# Patient Record
Sex: MALE | Race: WHITE
[De-identification: names, ages, dates, MRNs, and addresses within clinical notes are randomized per-mention and may not be internally consistent; named-entity substitution may affect disease eponyms.]

---

## 2021-01-15 NOTE — PCM.HP.2
H&P History of Present Illness





- General


Date of Service: 01/15/21


Admit Problem/Dx: 


                           Admission Diagnosis/Problem





Admission Diagnosis/Problem      NSTEMI, initial episode of care








Source of Information: Patient, Family, Provider


History Limitations: Reports: No Limitations





- History of Present Illness


Initial Comments - Free Text/Narative: 





CC: my stomach has been hurting 





HPI: Raymond presents to the ER with epigastric and lower chest pain that has been 

off and on for the past 2-3 nights.  Pain is also present during the day but 

typically at a lower level.  The most intense pain often wakes him up at night. 

He describes a dull pain that can be quite intense that starts just underneath 

the sternum and radiates up into the chest slightly and down into the abdomen 

slightly.  He has taken [place] which helps slightly.  He has taken 

nitroglycerin on a couple of occasions and this has been more helpful and even 

eliminated the pain for a while.  He has had similar but much less intense pain 

over the last couple of weeks.  He does report dyspnea on exertion with some 

good days and some bad days.  If he takes it easy he does not have much trouble.

 He has an occasional dry cough.  He does not report orthopnea.  No change in 

bowel habits.  He has a chronic indwelling catheter and has not had any trouble 

with this.  No lower extremity edema.  He does note that he ran out of his 

carvedilol about 2 weeks ago and things have been going downhill since that 

time.  He was admitted to [facility] in [place] in November and diagnosed with 

uncorrectable severe three-vessel coronary artery disease, intestinal angina as 

well as bladder outlet obstruction due to BPH.





Work-up in the emergency room has revealed a troponin of 1.5 and mild lateral ST

depressions.  The case was discussed with the on-call cardiologist at [facility] in

[place].  They felt that given his severe disease medical management was the only 

option.  He will be admitted to the intensive care unit for management of an 

non-ST elevation myocardial infarction.





- Related Data


Allergies/Adverse Reactions: 


                                    Allergies











Allergy/AdvReac Type Severity Reaction Status Date / Time


 


No Known Allergies Allergy   Verified 01/15/21 14:21











Home Medications: 


                                    Home Meds





Aspirin 81 mg PO DAILY 01/15/21 [History]


Bimatoprost [Lumigan 0.01% Ophth Soln] 1 drop EYEBOTH DAILY 01/15/21 [History]


Capsaicin [Zostrix 0.025% Crm] 1 dose TOP ASDIRECTED 01/15/21 [History]


Furosemide [Lasix] 20 mg PO DAILY 01/15/21 [History]


Hydrocodone/Acetaminophen [Hydrocodon-Acetaminophen 5-325] 1 tab PO ASDIRECTED 

01/15/21 [History]


Isosorbide Mononitrate [Isosorbide Mononitrate ER] 30 mg PO DAILY 01/15/21 

[History]


Mirtazapine 15 mg PO BEDTIME 01/15/21 [History]


Nitroglycerin 0.4 mg SL ASDIRECTED 01/15/21 [History]


Omeprazole 40 mg PO DAILY 01/15/21 [History]


Ondansetron [Zofran ODT] 4 mg PO ASDIRECTED 01/15/21 [History]


Sennosides/Docusate Sodium [Senna-Docusate Sodium Tablet] 2 tab PO DAILY 

01/15/21 [History]


Tamsulosin [Flomax] 0.4 mg PO DAILY 01/15/21 [History]


Timolol Maleate/PF [Timolol Maleate 0.5% Eye Drop] 1 dose EYEBOTH BEDTIME 

01/15/21 [History]


atorvaSTATin Calcium [Atorvastatin Calcium] 80 mg PO BEDTIME 01/15/21 [History]


carvediloL [Carvedilol] 6.25 mg PO BID 01/15/21 [History]











Past Medical History


HEENT History: Reports: Impaired Vision


Cardiovascular History: Reports: CAD, Heart Failure, Hypertension, MI


Gastrointestinal History: Reports: GERD


Genitourinary History: Reports: BPH, Other (See Below)


Other Genitourinary History: Chronic mercado cath


Musculoskeletal History: Reports: Fracture, Other (See Below)


Other Musculoskeletal History: shoulder dislocation





- Past Surgical History


HEENT Surgical History: Reports: Tonsillectomy


GI Surgical History: Reports: Hernia Repair/Other





Social & Family History





- Family History


Cardiac: Denies: CAD





- Tobacco Use


Tobacco Use Status *Q: Light Tobacco User


Years of Tobacco use: 72


Packs/Tins Daily: 0.5





- Caffeine Use


Caffeine Use: Reports: Coffee





- Alcohol Use


Alcohol Use History: No





- Recreational Drug Use


Recreational Drug Use: No





H&P Review of Systems





- Review of Systems:


Review Of Systems: See Below


Free Text/Narrative: 





A complete 12 point review of systems was obtained.  Pertinent positives and 

negatives are noted in the history of present illness.  All other systems were 

reviewed and were negative except as noted.





Exam





- Exam


Exam: See Below





- Vital Signs


Vital Signs: 


                                Last Vital Signs











Temp  36.6 C   01/15/21 14:24


 


Pulse  101 H  01/15/21 15:51


 


Resp  25 H  01/15/21 16:08


 


BP  148/96 H  01/15/21 16:08


 


Pulse Ox  98   01/15/21 16:08











Weight: 62.596 kg





- Exam


Quality Assessment: No: Supplemental Oxygen


General: Alert, Oriented, Cooperative.  No: Mild Distress


HEENT: Conjunctiva Clear.  No: Mucosa Moist & Pink (dry), Scleral Icterus


Neck: Supple, Trachea Midline


Lungs: Normal Respiratory Effort, Crackles (both bases)


Cardiovascular: Regular Rhythm, Tachycardia, Systolic Murmur, Gallop/S3


GI/Abdominal Exam: Normal Bowel Sounds, Soft, Non-Tender, No Distention


Back Exam: Normal Inspection, Full Range of Motion


Extremities: No Pedal Edema.  No: Increased Warmth


Peripheral Pulses: 1+: Dorsalis Pedis (L), Dorsalis Pedis (R)


Skin: Warm, Dry


Neuro Extensive - Mental Status: Alert, Oriented x3, Nl Response to Commands


Neuro Extensive - Motor, Sensory, Reflexes: No: Dysarthria, Abnormal Motor, 

Tremor


Psychiatric: Alert, Normal Affect





- Patient Data


Lab Results Last 24 hrs: 


                         Laboratory Results - last 24 hr











  01/15/21 01/15/21 01/15/21 Range/Units





  14:56 14:56 15:40 


 


WBC    7.2  (4.5-11.0)  K/uL


 


RBC    3.77 L  (4.30-5.90)  M/uL


 


Hgb    12.1  (12.0-15.0)  g/dL


 


Hct    38.0 L  (40.0-54.0)  %


 


MCV    101 H  (80-98)  fL


 


MCH    32 H  (27-31)  pg


 


MCHC    32  (32-36)  %


 


Plt Count    183  (150-400)  K/uL


 


Sodium   144   (140-148)  mmol/L


 


Potassium   4.1   (3.6-5.2)  mmol/L


 


Chloride   108   (100-108)  mmol/L


 


Carbon Dioxide   23   (21-32)  mmol/L


 


Anion Gap   13.2   (5.0-14.0)  mmol/L


 


BUN   32 H   (7-18)  mg/dL


 


Creatinine   1.1   (0.8-1.3)  mg/dL


 


Est Cr Clr Drug Dosing   43.47   mL/min


 


Estimated GFR (MDRD)   > 60   (>60)  


 


Glucose   104   ()  mg/dL


 


Calcium   9.2   (8.5-10.1)  mg/dL


 


Troponin I  1.497 H*    (0.000-0.056)  ng/mL


 


NT-Pro-B Natriuret Pep  16067 H    (5-450)  pg/mL











Result Diagrams: 


                                 01/15/21 15:40





                                 01/15/21 14:56


Imaging Impressions Last 24 hrs: 


CXR-images personally reviewed-lungs clear with no mass, infiltrate or effusion.

 heart size normal. 


  ** #1 Interpretation


EKG Date: 01/15/21


Rhythm: Other (sinus tachycardia)


Rate (Beats/Min): 116


Axis: LAD-Left Axis Deviation (LAFB)


P-Wave: Present


QRS: Normal


ST-T: Depressed (V4-V6)


QT: Normal


Comparison: NA - No Prior EKG


EKG Interpretation Comments: 





image personally reviewed 





Sepsis Event Note





- Evaluation


Sepsis Screening Result: No Definite Risk





- Focused Exam


Vital Signs: 


                                   Vital Signs











  Temp Pulse Pulse Resp BP BP Pulse Ox


 


 01/15/21 16:08     25 H   148/96 H  98


 


 01/15/21 15:51   101 H    151/99 H  


 


 01/15/21 14:53   104 H    179/101 H  


 


 01/15/21 14:24  36.6 C   115 H  22 H   178/103 H  96


 


 01/15/21 14:20  36.6 C   115 H  22 H   178/103 H  96














*Q Meaningful Use (ADM)





- VTE Risk Assess *Q


Each Risk Factor Represents 1 Point: Acute myocardial infarction, Congestive hea

rt failure (CHF)


Total Score 1 Point Risk Factors: 2


Each Risk Factor Represents 2 Points: None


Total Score 2 Point Risk Factors: 0


Each Risk Factor Represents 3 Points: Age 75 Years or Greater


Total Score 3 Point Risk Factors: 3


Each Risk Factor Represents 5 Points: None


Total Score 5 Point Risk Factors: 0


Venous Thromboembolism Risk Factor Score *Q: 5





- Problem List


(1) Non-STEMI (non-ST elevated myocardial infarction)


SNOMED Code(s): 94152337


   ICD Code: I21.4 - NON-ST ELEVATION (NSTEMI) MYOCARDIAL INFARCTION   Status: 

Acute   Current Visit: Yes   





(2) Systolic congestive heart failure


SNOMED Code(s): 30439160, 019328118


   ICD Code: I50.20 - UNSPECIFIED SYSTOLIC (CONGESTIVE) HEART FAILURE   Status: 

Chronic   Current Visit: Yes   


Qualifiers: 


   Heart failure chronicity: chronic   Qualified Code(s): I50.22 - Chronic 

systolic (congestive) heart failure   





(3) Intestinal angina


SNOMED Code(s): 743295090


   ICD Code: K55.1 - CHRONIC VASCULAR DISORDERS OF INTESTINE   Status: Chronic  

 Current Visit: Yes   





(4) BPH with urinary obstruction


SNOMED Code(s): 190767183


   ICD Code: N40.1 - BENIGN PROSTATIC HYPERPLASIA WITH LOWER URINARY TRACT SYMP;

 N13.8 - OTHER OBSTRUCTIVE AND REFLUX UROPATHY   Status: Chronic   Current 

Visit: Yes   





(5) 3-vessel coronary artery disease


Status: Chronic   Current Visit: Yes   Problem Details: uncorrectable 3 vessel 

disease (occluded RCA, 80% prox LAD, 99% circumflex and 90% M2)   


Problem List Initiated/Reviewed/Updated: Yes


Orders Last 24hrs: 


                               Active Orders 24 hr











 Category Date Time Status


 


 Patient Status Manage Transfer [TRANSFER] Routine ADT  01/15/21 16:49 Ordered


 


 Cardiac Monitoring [RC] .As Directed Care  01/15/21 14:16 Active


 


 EKG Documentation Completion [RC] ASDIRECTED Care  01/15/21 14:16 Active


 


 Chest 1V Frontal [CR] Stat Exams  01/15/21 15:41 Taken


 


 Resuscitation Status Routine Resus Stat  01/15/21 16:52 Ordered


 


 EKG 12 Lead [EK] Routine Ther  01/15/21 14:15 Ordered











Assessment/Plan Comment:: 





ASSESSMENT AND PLAN - 





Non-ST elevation myocardial infarction-this occurs in the setting of severe 

three-vessel coronary artery disease with a completely occluded RCA, 80% 

occluded proximal LAD and a 99% circumflex occlusion.  He was too high risk for 

PCI or CABG.  Discussed with on-call cardiologist and medical management rec

ommended.  He has received aspirin, 12.5 mg of carvedilol.


-Heparin bolus followed by heparin infusion for 24 to 48 hours


-Continue aspirin high-dose atorvastatin


-Start clopidogrel in the morning


-Continue beta-blocker, consider dose increase if needed


-Continue Imdur, consider dose increase


-Consider lisinopril if blood pressure will allow





Systolic congestive heart failure-chronic, stable at this time.  BNP is quite 

elevated but volume status appears appropriate based on examination.  He did 

receive furosemide in the emergency room but I do not believe he needs 

additional diuretics at this time.


-Continue medical management including beta-blocker, aspirin and statin


-Consider ACE inhibitor





Intestinal angina-suspected flow-limiting lesions noted on CT angiogram in [place]

 back in November.  Symptoms wax and wane.


-Small more frequent meals


-Additional medical management as above





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





Maintenance issues - 


- DVT prophylaxis -heparin


- GI prophylaxis -PPI


- Nutrition -low-sodium


- Mercado catheter -chronic indwelling Mercado





CODE STATUS -DNR/DNI





Admission justification -this patient will be admitted for inpatient services 

and is medically appropriate meeting medical necessity for inpatient admission 

as outlined in my documentation.  I reasonably expect the patient will require 

inpatient services that span a period time over 2 midnights. I reasonably expect

 this patient to be discharged or transferred within 96 hours [facility]





Disposition -I would anticipate discharge with home care





Primary care physician -Dr. Kyree Forrest M.D.





- Mortality Measure


Prognosis:: Poor

## 2021-01-16 NOTE — PCM.PN
- General Info


Date of Service: 01/16/21


Subjective Update: 





The patient had difficulty with confusion overnight.  He did receive 1 dose of 

lorazepam and has done well since that time.  His blood pressure and heart rate 

have trended down overnight.  Troponin level has trended down as well.  He 

reports very mild discomfort in the epigastric area today which is improved.  He

did have a mild increase in abdominal pain after breakfast.  Oxygenation has 

been good but he does report that he feels more short of breath today.  

Tolerating blood thinner so far.  Kidney function stable.


Functional Status: Reports: Pain Controlled, Tolerating Diet





- Review of Systems


General: Denies: Fever


Pulmonary: Reports: Shortness of Breath


Gastrointestinal: Reports: Abdominal Pain (mild)





- Patient Data


Vitals - Most Recent: 


                                Last Vital Signs











Temp  36.6 C   01/16/21 08:00


 


Pulse  93   01/16/21 08:35


 


Resp  23 H  01/16/21 08:00


 


BP  157/100 H  01/16/21 08:36


 


Pulse Ox  95   01/16/21 08:00











Weight - Most Recent: 60.781 kg


I&O - Last 24 Hours: 


                                 Intake & Output











 01/15/21 01/16/21 01/16/21





 22:59 06:59 14:59


 


Intake Total  120 240


 


Output Total 2450 250 


 


Balance -2450 -130 240











Lab Results Last 24 Hours: 


                         Laboratory Results - last 24 hr











  01/15/21 01/15/21 01/15/21 Range/Units





  14:56 14:56 15:40 


 


WBC    7.2  (4.5-11.0)  K/uL


 


RBC    3.77 L  (4.30-5.90)  M/uL


 


Hgb    12.1  (12.0-15.0)  g/dL


 


Hct    38.0 L  (40.0-54.0)  %


 


MCV    101 H  (80-98)  fL


 


MCH    32 H  (27-31)  pg


 


MCHC    32  (32-36)  %


 


Plt Count    183  (150-400)  K/uL


 


APTT     (27.0-36.0)  sec


 


Sodium   144   (140-148)  mmol/L


 


Potassium   4.1   (3.6-5.2)  mmol/L


 


Chloride   108   (100-108)  mmol/L


 


Carbon Dioxide   23   (21-32)  mmol/L


 


Anion Gap   13.2   (5.0-14.0)  mmol/L


 


BUN   32 H   (7-18)  mg/dL


 


Creatinine   1.1   (0.8-1.3)  mg/dL


 


Est Cr Clr Drug Dosing   43.47   mL/min


 


Estimated GFR (MDRD)   > 60   (>60)  


 


Glucose   104   ()  mg/dL


 


Calcium   9.2   (8.5-10.1)  mg/dL


 


Magnesium     (1.8-2.4)  mg/dL


 


Troponin I  1.497 H*    (0.000-0.056)  ng/mL


 


NT-Pro-B Natriuret Pep  69534 H    (5-450)  pg/mL














  01/15/21 01/15/21 01/16/21 Range/Units





  17:15 20:02 00:30 


 


WBC     (4.5-11.0)  K/uL


 


RBC     (4.30-5.90)  M/uL


 


Hgb     (12.0-15.0)  g/dL


 


Hct     (40.0-54.0)  %


 


MCV     (80-98)  fL


 


MCH     (27-31)  pg


 


MCHC     (32-36)  %


 


Plt Count     (150-400)  K/uL


 


APTT  40.8 H   28.8  (27.0-36.0)  sec


 


Sodium     (140-148)  mmol/L


 


Potassium     (3.6-5.2)  mmol/L


 


Chloride     (100-108)  mmol/L


 


Carbon Dioxide     (21-32)  mmol/L


 


Anion Gap     (5.0-14.0)  mmol/L


 


BUN     (7-18)  mg/dL


 


Creatinine     (0.8-1.3)  mg/dL


 


Est Cr Clr Drug Dosing     mL/min


 


Estimated GFR (MDRD)     (>60)  


 


Glucose     ()  mg/dL


 


Calcium     (8.5-10.1)  mg/dL


 


Magnesium     (1.8-2.4)  mg/dL


 


Troponin I   1.431 H*   (0.000-0.056)  ng/mL


 


NT-Pro-B Natriuret Pep     (5-450)  pg/mL














  01/16/21 01/16/21 01/16/21 Range/Units





  05:53 05:53 05:53 


 


WBC   8.3   (4.5-11.0)  K/uL


 


RBC   4.08 L   (4.30-5.90)  M/uL


 


Hgb   13.6   (12.0-15.0)  g/dL


 


Hct   40.5   (40.0-54.0)  %


 


MCV   99 H   (80-98)  fL


 


MCH   33 H   (27-31)  pg


 


MCHC   34   (32-36)  %


 


Plt Count   176   (150-400)  K/uL


 


APTT     (27.0-36.0)  sec


 


Sodium    142  (140-148)  mmol/L


 


Potassium    3.5 L  (3.6-5.2)  mmol/L


 


Chloride    105  (100-108)  mmol/L


 


Carbon Dioxide    23  (21-32)  mmol/L


 


Anion Gap    17.5 H  (5.0-14.0)  mmol/L


 


BUN    29 H  (7-18)  mg/dL


 


Creatinine    1.1  (0.8-1.3)  mg/dL


 


Est Cr Clr Drug Dosing    42.21  mL/min


 


Estimated GFR (MDRD)    > 60  (>60)  


 


Glucose    103  ()  mg/dL


 


Calcium    9.3  (8.5-10.1)  mg/dL


 


Magnesium    1.8  (1.8-2.4)  mg/dL


 


Troponin I  1.297 H*    (0.000-0.056)  ng/mL


 


NT-Pro-B Natriuret Pep     (5-450)  pg/mL














  01/16/21 Range/Units





  05:53 


 


WBC   (4.5-11.0)  K/uL


 


RBC   (4.30-5.90)  M/uL


 


Hgb   (12.0-15.0)  g/dL


 


Hct   (40.0-54.0)  %


 


MCV   (80-98)  fL


 


MCH   (27-31)  pg


 


MCHC   (32-36)  %


 


Plt Count   (150-400)  K/uL


 


APTT  26.6 L  (27.0-36.0)  sec


 


Sodium   (140-148)  mmol/L


 


Potassium   (3.6-5.2)  mmol/L


 


Chloride   (100-108)  mmol/L


 


Carbon Dioxide   (21-32)  mmol/L


 


Anion Gap   (5.0-14.0)  mmol/L


 


BUN   (7-18)  mg/dL


 


Creatinine   (0.8-1.3)  mg/dL


 


Est Cr Clr Drug Dosing   mL/min


 


Estimated GFR (MDRD)   (>60)  


 


Glucose   ()  mg/dL


 


Calcium   (8.5-10.1)  mg/dL


 


Magnesium   (1.8-2.4)  mg/dL


 


Troponin I   (0.000-0.056)  ng/mL


 


NT-Pro-B Natriuret Pep   (5-450)  pg/mL











Med Orders - Current: 


                               Current Medications





Acetaminophen (Tylenol)  650 mg PO Q4H PRN


   PRN Reason: Pain (Mild 1-3)/fever


Hydrocodone Bitart/Acetaminophen (Norco 325-5 Mg)  1 tab PO Q4H PRN


   PRN Reason: Pain


Aspirin (Aspirin)  81 mg PO DAILY [facility]


   Last Admin: 01/16/21 08:36 Dose:  81 mg


   Documented by: 


Atorvastatin Calcium (Lipitor)  80 mg PO BEDTIME [facility]


   Last Admin: 01/15/21 20:09 Dose:  80 mg


   Documented by: 


Carvedilol (Coreg)  12.5 mg PO BIDAC [facility]


Clopidogrel Bisulfate (Plavix)  75 mg PO DAILY [facility]


   Last Admin: 01/16/21 08:36 Dose:  75 mg


   Documented by: 


Furosemide (Lasix)  20 mg PO DAILY [facility]


   Last Admin: 01/16/21 08:36 Dose:  20 mg


   Documented by: 


Haloperidol (Haldol)  2.5 mg PO Q4H PRN


   PRN Reason: Agitation


Sodium Chloride (Normal Saline)  1,000 mls @ 25 mls/hr IV ASDIRECTED [facility]


   Last Admin: 01/15/21 17:38 Dose:  25 mls/hr


   Documented by: 


Heparin Sodium/Dextrose (Heparin 25,000 Units In D5w 500 Ml)  25,000 units in 

500 mls @ 15.023 mls/hr IV TITRATE [facility]; Protocol


   Last Titration: 01/16/21 06:34 Dose:  22 units/kg/hr, 27.542 mls/hr


   Documented by: 


Isosorbide Mononitrate (Imdur)  30 mg PO DAILY [facility]


   Last Admin: 01/16/21 08:36 Dose:  30 mg


   Documented by: 


Latanoprost (Xalatan 0.005% Ophth Soln)  0 ml EYEBOTH BEDTIME [facility]


   Last Admin: 01/15/21 20:11 Dose:  1 drop


   Documented by: 


Magnesium Hydroxide (Milk Of Magnesia)  30 ml PO Q12H PRN


   PRN Reason: Constipation


Melatonin (Melatonin)  9 mg PO BEDTIME [facility]


Mirtazapine (Remeron)  15 mg PO BEDTIME [facility]


   Last Admin: 01/15/21 20:10 Dose:  15 mg


   Documented by: 


Morphine Sulfate (Morphine)  2 mg IVPUSH Q2H PRN


   PRN Reason: Pain (severe 7-10)


Ondansetron HCl (Zofran)  4 mg IV Q6H PRN


   PRN Reason: Nausea/Vomiting


Ondansetron HCl (Zofran Odt)  4 mg PO Q6H PRN


   PRN Reason: Nausea able to take PO


Pantoprazole Sodium (Protonix***)  40 mg PO ACBREAKFAST [facility]


   Last Admin: 01/16/21 07:47 Dose:  40 mg


   Documented by: 


Senna/Docusate Sodium (Senna Plus)  2 tab PO DAILY [facility]


   Last Admin: 01/16/21 08:36 Dose:  2 tab


   Documented by: 


Senna/Docusate Sodium (Senna Plus)  1 tab PO BID PRN


   PRN Reason: Constipation


Tamsulosin HCl (Flomax)  0.4 mg PO DAILY [facility]


   Last Admin: 01/16/21 08:36 Dose:  0.4 mg


   Documented by: 


Timolol Maleate (Timoptic 0.5% Ophth Soln)  0 ml EYEBOTH DAILY [facility]


   Last Admin: 01/16/21 08:37 Dose:  1 drop


   Documented by: 





Discontinued Medications





Aspirin (Aspirin)  243 mg PO ONETIME ONE


   Stop: 01/15/21 15:41


   Last Admin: 01/15/21 15:51 Dose:  243 mg


   Documented by: 


Carvedilol (Coreg)  6.25 mg PO ONETIME ONE


   Stop: 01/15/21 14:38


   Last Admin: 01/15/21 14:53 Dose:  6.25 mg


   Documented by: 


Carvedilol (Coreg)  6.25 mg PO ONETIME ONE


   Stop: 01/15/21 15:45


   Last Admin: 01/15/21 15:51 Dose:  6.25 mg


   Documented by: 


Carvedilol (Coreg)  6.25 mg PO BIDAC [facility]


   Last Admin: 01/16/21 08:02 Dose:  6.25 mg


   Documented by: 


Carvedilol (Coreg)  6.25 mg PO ONETIME ONE


   Stop: 01/16/21 08:16


   Last Admin: 01/16/21 08:35 Dose:  6.25 mg


   Documented by: 


Furosemide (Lasix)  40 mg IVPUSH ONETIME ONE


   Stop: 01/15/21 16:07


   Last Admin: 01/15/21 16:16 Dose:  40 mg


   Documented by: 


Heparin Sodium (Porcine) (Heparin Sodium)  4,000 units IVPUSH ONETIME ONE


   Stop: 01/15/21 15:43


   Last Admin: 01/15/21 15:54 Dose:  4,000 units


   Documented by: 


Lorazepam (Ativan)  0.5 mg IVPUSH Q4H PRN


   PRN Reason: Nausea/Vomiting


   Last Admin: 01/15/21 20:35 Dose:  0.5 mg


   Documented by: 


Potassium Chloride (Klor-Con M20)  40 meq PO ONETIME ONE


   Stop: 01/16/21 08:16


   Last Admin: 01/16/21 08:35 Dose:  40 meq


   Documented by: 


Ticagrelor (Brilinta)  180 mg PO ONETIME ONE


   Stop: 01/15/21 15:44


   Last Admin: 01/15/21 15:51 Dose:  180 mg


   Documented by: 











- Exam


Quality Assessment: No: Supplemental Oxygen


General: Alert, Oriented, Cooperative, No Acute Distress


Neck: No JVD


Lungs: Normal Respiratory Effort, Decreased Breath Sounds (mild both bases)


Cardiovascular: Regular Rate, Irregular Rhythm, Murmurs


GI/Abdominal Exam: Soft, No Distention


Extremities: No Pedal Edema.  No: Increased Warmth


Skin: Warm, Dry


Psy/Mental Status: Alert, Normal Affect





Sepsis Event Note





- Evaluation


Sepsis Screening Result: No Definite Risk





- Focused Exam


Vital Signs: 


                                   Vital Signs











  Temp Pulse Pulse Resp BP BP Pulse Ox


 


 01/16/21 08:36      157/100 H  


 


 01/16/21 08:35   93    157/100 H  


 


 01/16/21 08:02   108 H    158/94 H  


 


 01/16/21 08:00  36.6 C   108 H  23 H   158/94 H  95


 


 01/16/21 07:00        96


 


 01/16/21 06:00  36.9 C   111 H  20   148/85 H  96


 


 01/16/21 00:00    95  22 H    96














- Problem List & Annotations


(1) Non-STEMI (non-ST elevated myocardial infarction)


SNOMED Code(s): 13535415


   Code(s): I21.4 - NON-ST ELEVATION (NSTEMI) MYOCARDIAL INFARCTION   Status: 

Acute   Current Visit: Yes   





(2) Systolic congestive heart failure


SNOMED Code(s): 58513384, 901003346


   Code(s): I50.20 - UNSPECIFIED SYSTOLIC (CONGESTIVE) HEART FAILURE   Status: 

Chronic   Current Visit: Yes   


Qualifiers: 


   Heart failure chronicity: chronic   Qualified Code(s): I50.22 - Chronic 

systolic (congestive) heart failure   





(3) Intestinal angina


SNOMED Code(s): 192500836


   Code(s): K55.1 - CHRONIC VASCULAR DISORDERS OF INTESTINE   Status: Chronic   

Current Visit: Yes   





(4) BPH with urinary obstruction


SNOMED Code(s): 759672096


   Code(s): N40.1 - BENIGN PROSTATIC HYPERPLASIA WITH LOWER URINARY TRACT SYMP; 

N13.8 - OTHER OBSTRUCTIVE AND REFLUX UROPATHY   Status: Chronic   Current Visit:

Yes   





(5) 3-vessel coronary artery disease


Status: Chronic   Current Visit: Yes   Annotation/Comment:: uncorrectable 3 

vessel disease (occluded RCA, 80% prox LAD, 99% circumflex and 90% M2)   





- Problem List Review


Problem List Initiated/Reviewed/Updated: Yes





- My Orders


Last 24 Hours: 


My Active Orders





01/15/21 16:52


Resuscitation Status Routine 





01/15/21 Dinner


2 Gram Sodium Diet [DIET] 





01/15/21 17:15


Acetaminophen [TylenoL]   650 mg PO Q4H PRN 


Acetaminophen/HYDROcodone [Norco 325-5 MG]   1 tab PO Q4H PRN 


Docusate Sodium/Sennosides [Senna Plus]   1 tab PO BID PRN 


Heparin Sodium/D5W [Heparin 25,000 Units in D5W 500 ML] 25,000 units in 500 ml 

IV TITRATE 


Magnesium Hydroxide [Milk of Magnesia]   30 ml PO Q12H PRN 


Morphine   2 mg IVPUSH Q2H PRN 


Ondansetron [Zofran ODT]   4 mg PO Q6H PRN 


Ondansetron [Zofran]   4 mg IV Q6H PRN 


Sodium Chloride 0.9% [Normal Saline] 1,000 ml IV ASDIRECTED 





01/15/21 17:15


Patient Status [ADT] Routine 


Cardiac Monitoring [RC] CONTINUOUS 


Height and Weight [RC] DAILY 


Intake and Output [RC] QSHIFT 


Notify Provider Vital Signs [RC] ASDIRECTED 


Oxygen Therapy [RC] PRN 


Pulse Oximetry [RC] CONTINUOUS 


Up With Assistance [RC] ASDIRECTED 


VTE/DVT Education [RC] Per Unit Routine 


Vital Signs [RC] Q2HR 





01/15/21 21:00


Latanoprost [Xalatan 0.005% Ophth Soln]   0 ml EYEBOTH BEDTIME 


Mirtazapine [Remeron]   15 mg PO BEDTIME 


atorvaSTATin [Lipitor]   80 mg PO BEDTIME 





01/16/21 07:30


Pantoprazole [ProTONIX***]   40 mg PO ACBREAKFAST 





01/16/21 09:00


Aspirin   81 mg PO DAILY 


Clopidogrel [Plavix]   75 mg PO DAILY 


Docusate Sodium/Sennosides [Senna Plus]   2 tab PO DAILY 


Furosemide [Lasix]   20 mg PO DAILY 


Isosorbide Mononitrate [Imdur]   30 mg PO DAILY 


Tamsulosin [Flomax]   0.4 mg PO DAILY 


timoloL maleate [Timoptic 0.5% Ophth Soln]   0 ml EYEBOTH DAILY 





01/16/21 10:55


haloperidoL [Haldol]   2.5 mg PO Q4H PRN 





01/16/21 11:00


PTT,PARTIAL THROMBOPLSTIN TIME [COAG] Q6H 





01/16/21 16:30


carvediloL [Coreg]   12.5 mg PO BIDAC 





01/16/21 17:00


PTT,PARTIAL THROMBOPLSTIN TIME [COAG] Q6H 





01/16/21 21:00


Melatonin   9 mg PO BEDTIME 





01/16/21 23:00


PTT,PARTIAL THROMBOPLSTIN TIME [COAG] Q6H 





01/17/21 05:00


BASIC METABOLIC PANEL,BMP [CHEM] Timed 


TROPONIN I [CHEM] Timed 














- Plan


Plan:: 





ASSESSMENT AND PLAN - 





Non-ST elevation myocardial infarction-this occurs in the setting of severe 

three-vessel coronary artery disease with a completely occluded RCA, 80% 

occluded proximal LAD and a 99% circumflex occlusion.  Troponin level trending 

down.  Symptoms have been improving.  Heart rate and blood pressure are 

improving.


-Heparin bolus followed by heparin infusion for 24 to 48 hours


-Continue aspirin, high-dose atorvastatin


-Continue clopidogrel (new medication)


-Continue beta-blocker with dose increased to 12.5 mg


-Continue Imdur, consider dose increase


-Consider lisinopril if blood pressure will allow


-Recheck troponin in the morning





Systolic congestive heart failure-chronic, stable at this time.  He does feel 

short of breath but his volume status appears appropriate with no JVD.


-Continue medical management including beta-blocker, aspirin and statin


-Consider ACE inhibitor





Intestinal angina-suspected flow-limiting lesions noted on CT angiogram in [place]

 back in November.  Symptoms mild today and have improved with improvements in 

blood pressure and heart rate.


-Small, more frequent meals


-Additional medical management as above





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





Maintenance issues - 


- DVT prophylaxis -heparin


- GI prophylaxis -PPI


- Nutrition -low-sodium


- Cook catheter -chronic indwelling Cook





CODE STATUS -DNR/DNI





Admission justification -this patient will be admitted for inpatient services 

and is medically appropriate meeting medical necessity for inpatient admission 

as outlined in my documentation.  I reasonably expect the patient will require 

inpatient services that span a period time over 2 midnights. I reasonably expect

 this patient to be discharged or transferred within 96 hours after admission to

 the [facility].





Disposition -I would anticipate discharge home with home care





Primary care physician -Angel Allen in [place] M.D.

## 2021-01-17 NOTE — PCM.PN
- General Info


Date of Service: 01/17/21


Subjective Update: 





Mr. Manzo has been stable over the last 24 hours, denies significant chest pain

or shortness of breath.  Troponin level has improved from admission and he has 

been hemodynamically stable.


Functional Status: Reports: Tolerating Diet, Ambulating, Urinating





- Review of Systems


General: Reports: Weakness, Fatigue.  Denies: Fever, Chills


Pulmonary: Reports: No Symptoms


Cardiovascular: Reports: No Symptoms


Gastrointestinal: Reports: No Symptoms


Genitourinary: Reports: No Symptoms





- Patient Data


Vitals - Most Recent: 


                                Last Vital Signs











Temp  97.6 F   01/17/21 07:00


 


Pulse  83   01/17/21 09:28


 


Resp  18   01/17/21 09:28


 


BP  104/60   01/17/21 09:28


 


Pulse Ox  94 L  01/17/21 09:28











Weight - Most Recent: 134 lb


I&O - Last 24 Hours: 


                                 Intake & Output











 01/16/21 01/17/21 01/17/21





 22:59 06:59 14:59


 


Intake Total  1507 440


 


Output Total  200 


 


Balance  1307 440











Lab Results Last 24 Hours: 


                         Laboratory Results - last 24 hr











  01/16/21 01/16/21 01/17/21 Range/Units





  16:52 23:00 05:50 


 


APTT  66.5 H  86.1 H   (27.0-36.0)  sec


 


Sodium    146  (140-148)  mmol/L


 


Potassium    3.3 L  (3.6-5.2)  mmol/L


 


Chloride    109 H  (100-108)  mmol/L


 


Carbon Dioxide    24  (21-32)  mmol/L


 


Anion Gap    16.3 H  (5.0-14.0)  mmol/L


 


BUN    35 H  (7-18)  mg/dL


 


Creatinine    1.3  (0.8-1.3)  mg/dL


 


Est Cr Clr Drug Dosing    35.72  mL/min


 


Estimated GFR (MDRD)    52 L  (>60)  


 


Glucose    97  ()  mg/dL


 


Calcium    8.7  (8.5-10.1)  mg/dL


 


Troponin I    0.840 H*  (0.000-0.056)  ng/mL














  01/17/21 Range/Units





  05:50 


 


APTT  84.8 H  (27.0-36.0)  sec


 


Sodium   (140-148)  mmol/L


 


Potassium   (3.6-5.2)  mmol/L


 


Chloride   (100-108)  mmol/L


 


Carbon Dioxide   (21-32)  mmol/L


 


Anion Gap   (5.0-14.0)  mmol/L


 


BUN   (7-18)  mg/dL


 


Creatinine   (0.8-1.3)  mg/dL


 


Est Cr Clr Drug Dosing   mL/min


 


Estimated GFR (MDRD)   (>60)  


 


Glucose   ()  mg/dL


 


Calcium   (8.5-10.1)  mg/dL


 


Troponin I   (0.000-0.056)  ng/mL











Med Orders - Current: 


                               Current Medications





Acetaminophen (Tylenol)  650 mg PO Q4H PRN


   PRN Reason: Pain (Mild 1-3)/fever


Hydrocodone Bitart/Acetaminophen (Norco 325-5 Mg)  1 tab PO Q4H PRN


   PRN Reason: Pain


Aspirin (Aspirin)  81 mg PO DAILY [facility]


   Last Admin: 01/17/21 07:59 Dose:  81 mg


   Documented by: 


Atorvastatin Calcium (Lipitor)  80 mg PO BEDTIME [facility]


   Last Admin: 01/16/21 20:21 Dose:  80 mg


   Documented by: 


Carvedilol (Coreg)  12.5 mg PO BIDAC [facility]


   Last Admin: 01/17/21 07:52 Dose:  12.5 mg


   Documented by: 


Clopidogrel Bisulfate (Plavix)  75 mg PO DAILY [facility]


   Last Admin: 01/17/21 08:00 Dose:  75 mg


   Documented by: 


Furosemide (Lasix)  20 mg PO DAILY [facility]


   Last Admin: 01/17/21 07:59 Dose:  20 mg


   Documented by: 


Haloperidol (Haldol)  2.5 mg PO Q4H PRN


   PRN Reason: Agitation


Isosorbide Mononitrate (Imdur)  30 mg PO DAILY [facility]


   Last Admin: 01/17/21 07:59 Dose:  30 mg


   Documented by: 


Latanoprost (Xalatan 0.005% Ophth Soln)  0 ml EYEBOTH BEDTIME [facility]


   Last Admin: 01/16/21 20:19 Dose:  1 drop


   Documented by: 


Magnesium Hydroxide (Milk Of Magnesia)  30 ml PO Q12H PRN


   PRN Reason: Constipation


Melatonin (Melatonin)  9 mg PO BEDTIME [facility]


   Last Admin: 01/16/21 20:22 Dose:  9 mg


   Documented by: 


Mirtazapine (Remeron)  15 mg PO BEDTIME [facility]


   Last Admin: 01/16/21 20:22 Dose:  15 mg


   Documented by: 


Morphine Sulfate (Morphine)  2 mg IVPUSH Q2H PRN


   PRN Reason: Pain (severe 7-10)


Ondansetron HCl (Zofran)  4 mg IV Q6H PRN


   PRN Reason: Nausea/Vomiting


Ondansetron HCl (Zofran Odt)  4 mg PO Q6H PRN


   PRN Reason: Nausea able to take PO


Pantoprazole Sodium (Protonix***)  40 mg PO ACBREAKFAST [facility]


   Last Admin: 01/17/21 07:53 Dose:  40 mg


   Documented by: 


Potassium Chloride (Klor-Con M20)  40 meq PO ONETIME ONE


   Stop: 01/17/21 12:36


Potassium Chloride (Klor-Con M20)  40 meq PO ONETIME ONE


   Stop: 01/17/21 17:01


Senna/Docusate Sodium (Senna Plus)  2 tab PO DAILY [facility]


   Last Admin: 01/17/21 07:59 Dose:  2 tab


   Documented by: 


Senna/Docusate Sodium (Senna Plus)  1 tab PO BID PRN


   PRN Reason: Constipation


Tamsulosin HCl (Flomax)  0.4 mg PO DAILY [facility]


   Last Admin: 01/17/21 07:59 Dose:  0.4 mg


   Documented by: 


Timolol Maleate (Timoptic 0.5% Ophth Soln)  0 ml EYEBOTH DAILY [facility]


   Last Admin: 01/17/21 08:00 Dose:  1 drop


   Documented by: 





Discontinued Medications





Aspirin (Aspirin)  243 mg PO ONETIME ONE


   Stop: 01/15/21 15:41


   Last Admin: 01/15/21 15:51 Dose:  243 mg


   Documented by: 


Carvedilol (Coreg)  6.25 mg PO ONETIME ONE


   Stop: 01/15/21 14:38


   Last Admin: 01/15/21 14:53 Dose:  6.25 mg


   Documented by: 


Carvedilol (Coreg)  6.25 mg PO ONETIME ONE


   Stop: 01/15/21 15:45


   Last Admin: 01/15/21 15:51 Dose:  6.25 mg


   Documented by: 


Carvedilol (Coreg)  6.25 mg PO BIDAC [facility]


   Last Admin: 01/16/21 08:02 Dose:  6.25 mg


   Documented by: 


Carvedilol (Coreg)  6.25 mg PO ONETIME ONE


   Stop: 01/16/21 08:16


   Last Admin: 01/16/21 08:35 Dose:  6.25 mg


   Documented by: 


Furosemide (Lasix)  40 mg IVPUSH ONETIME ONE


   Stop: 01/15/21 16:07


   Last Admin: 01/15/21 16:16 Dose:  40 mg


   Documented by: 


Heparin Sodium (Porcine) (Heparin Sodium)  4,000 units IVPUSH ONETIME ONE


   Stop: 01/15/21 15:43


   Last Admin: 01/15/21 15:54 Dose:  4,000 units


   Documented by: 


Heparin Sodium (Porcine) (Heparin Sodium)  1,000 units IVPUSH .BOLUS ONE


   Stop: 01/16/21 11:51


   Last Admin: 01/16/21 11:50 Dose:  1,000 units


   Documented by: 


Sodium Chloride (Normal Saline)  1,000 mls @ 25 mls/hr IV ASDIRECTED [facility]


   Last Admin: 01/15/21 17:38 Dose:  25 mls/hr


   Documented by: 


Heparin Sodium/Dextrose (Heparin 25,000 Units In D5w 500 Ml)  25,000 units in 

500 mls @ 15.023 mls/hr IV TITRATE [facility]; Protocol


   Last Admin: 01/17/21 07:48 Dose:  20 units/kg/hr, 25.038 mls/hr


   Documented by: 


Lorazepam (Ativan)  0.5 mg IVPUSH Q4H PRN


   PRN Reason: Nausea/Vomiting


   Last Admin: 01/15/21 20:35 Dose:  0.5 mg


   Documented by: 


Potassium Chloride (Klor-Con M20)  40 meq PO ONETIME ONE


   Stop: 01/16/21 08:16


   Last Admin: 01/16/21 08:35 Dose:  40 meq


   Documented by: 


Ticagrelor (Brilinta)  180 mg PO ONETIME ONE


   Stop: 01/15/21 15:44


   Last Admin: 01/15/21 15:51 Dose:  180 mg


   Documented by: 











- Exam


Quality Assessment: DVT Prophylaxis


General: Alert, Oriented, Cooperative, No Acute Distress


Lungs: Clear to Auscultation, Normal Respiratory Effort


Cardiovascular: Regular Rate, Regular Rhythm, Murmurs


GI/Abdominal Exam: Soft, Non-Tender, No Organomegaly, No Distention


Extremities: Non-Tender, No Pedal Edema





Sepsis Event Note





- Evaluation


Sepsis Screening Result: No Definite Risk





- Focused Exam


Vital Signs: 


                                   Vital Signs











  Temp Pulse Pulse Resp BP BP Pulse Ox


 


 01/17/21 09:28    83  18   104/60  94 L


 


 01/17/21 08:00    90  16   117/63 


 


 01/17/21 07:59      117/63  


 


 01/17/21 07:52   94    117/63  


 


 01/17/21 07:00  97.6 F   91  21 H   159/52 H  94 L


 


 01/17/21 05:58    89  22 H   


 


 01/17/21 04:00    98  21 H   108/70 


 


 01/17/21 02:00    99  15   121/60  90 L














- Problem List Review


Problem List Initiated/Reviewed/Updated: Yes





- My Orders


Last 24 Hours: 


My Active Orders





01/17/21 12:35


Potassium Chloride [Klor-Con M20]   40 meq PO ONETIME ONE 





01/17/21 12:36


Convert IV to Saline Lock [OM.PC] Routine 





01/17/21 17:00


Potassium Chloride [Klor-Con M20]   40 meq PO ONETIME ONE 





01/18/21 05:00


TROPONIN I [CHEM] Timed 





01/18/21 05:11


POTASSIUM,K [CHEM] AM 














- Plan


Plan:: 





ASSESSMENT AND PLAN - 





Non-ST elevation myocardial infarction-this occurs in the setting of severe 

three-vessel coronary artery disease with a completely occluded RCA, 80% 

occluded proximal LAD and a 99% circumflex occlusion.  Troponin level improved 

over the last 24 hours, no symptoms of chest pain.


-Continue aspirin, high-dose atorvastatin


-Continue clopidogrel (new medication)


-Continue beta-blocker with dose increased to 12.5 mg


-Continue Imdur


-Consider lisinopril if blood pressure will allow


-Recheck troponin in the morning





Systolic congestive heart failure-chronic, stable at this time.  He does feel 

short of breath but his volume status appears appropriate with no JVD.


-Continue medical management including beta-blocker, aspirin and statin


-Consider ACE inhibitor





Intestinal angina-suspected flow-limiting lesions noted on CT angiogram in [place]

 back in November.  Symptoms mild today and have improved with improvements in 

blood pressure and heart rate.


-Small, more frequent meals


-Additional medical management as above





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





Maintenance issues - 


- DVT prophylaxis -heparin


- GI prophylaxis -PPI


- Nutrition -low-sodium


- Cook catheter -chronic indwelling Cook





CODE STATUS -DNR/DNI





Admission justification -this patient will be admitted for inpatient services 

and is medically appropriate meeting medical necessity for inpatient admission 

as outlined in my documentation.  I reasonably expect the patient will require 

inpatient services that span a period time over 2 midnights. I reasonably expect

 this patient to be discharged or transferred within 96 hours after admission to

 the [facility].





Disposition -I would anticipate discharge home with home care





Primary care physician -Angel Allen in [place]

## 2021-01-18 NOTE — CR
CHEST: Portable 1/15/2021 at 4:07 PM

 

CLINICAL HISTORY:Chest pain

 

COMPARISON:None

 

FINDINGS:  The heart is enlarged. Pulmonary vascularity appears cephalized.

There is generalized interstitial prominence which is suspect for edema. This is

superimposed over some chronic lung field changes. There are no effusions. There

are atherosclerotic changes in the aorta.

 

IMPRESSION: Cardiomegaly with some vascular cephalization and interstitial

prominence suggest CHF. This is superimposed over some chronic lung changes.

## 2021-01-18 NOTE — PCM.DCSUM1
**Discharge Summary





- Hospital Course


Brief History: Mr. Manzo is an 85-year-old gentleman who was admitted through 

the emergency department with chest pain secondary to a non-ST segment elevation

myocardial infarction.





- Discharge Data


Discharge Date: 01/18/21


Discharge Disposition: Home, Self-Care 01


Condition: Fair





- Referral to Home Health


Primary Care Physician: 


PCP None








- Discharge Diagnosis/Problem(s)


(1) Non-STEMI (non-ST elevated myocardial infarction)


SNOMED Code(s): 47716033


   ICD Code: I21.4 - NON-ST ELEVATION (NSTEMI) MYOCARDIAL INFARCTION   Status: 

Acute   Current Visit: Yes   





(2) Systolic congestive heart failure


SNOMED Code(s): 51465239, 602005040


   ICD Code: I50.20 - UNSPECIFIED SYSTOLIC (CONGESTIVE) HEART FAILURE   Status: 

Chronic   Current Visit: Yes   


Qualifiers: 


   Heart failure chronicity: chronic   Qualified Code(s): I50.22 - Chronic 

systolic (congestive) heart failure   





(3) Intestinal angina


SNOMED Code(s): 930442111


   ICD Code: K55.1 - CHRONIC VASCULAR DISORDERS OF INTESTINE   Status: Chronic  

Current Visit: Yes   





(4) BPH with urinary obstruction


SNOMED Code(s): 793356145


   ICD Code: N40.1 - BENIGN PROSTATIC HYPERPLASIA WITH LOWER URINARY TRACT SYMP;

N13.8 - OTHER OBSTRUCTIVE AND REFLUX UROPATHY   Status: Chronic   Current Visit:

Yes   





(5) 3-vessel coronary artery disease


Status: Chronic   Current Visit: Yes   Problem Details: uncorrectable 3 vessel 

disease (occluded RCA, 80% prox LAD, 99% circumflex and 90% M2)   





- Patient Summary/Data


Hospital Course: 





Mr. Manzo presented to the ER with epigastric and lower chest pain that has 

been off and on for 2-3 nights.  Pain was also present during the day but 

typically at a lower level.  The most intense pain often wakes him up at night. 

He describes a dull pain that can be quite intense that starts just underneath 

the sternum and radiates up into the chest slightly and down into the abdomen 

slightly.  He has taken [employer] which helps slightly.  He has taken 

nitroglycerin on a couple of occasions and this has been more helpful and even 

eliminated the pain for a while.  He has had similar but much less intense pain 

over the last couple of weeks.  He does report dyspnea on exertion with some 

good days and some bad days.  If he takes it easy he does not have much trouble.

 He does note that he ran out of his carvedilol about 2 weeks ago and things 

have been going downhill since that time.  He was admitted to [facility] 

in November and diagnosed with uncorrectable severe three-vessel coronary artery

disease, intestinal angina as well as bladder outlet obstruction due to BPH. 

Work-up in the emergency room has revealed a troponin of 1.5 and mild lateral ST

depressions.  The case was discussed with the on-call cardiologist at [facility] in

[place].  They felt that given his severe disease medical management was the only 

option.  He was admitted to the intensive care unit for management of an non-ST 

elevation myocardial infarction.  On admission he was started on IV heparin and 

this was continued for 2 days.  In addition he was started on Plavix 75 mg daily

and his dose of Coreg was increased to 12.5 mg twice daily.  Consideration was 

given for addition of ACE inhibitor therapy but this was not started during the 

hospitalization because of lower blood pressures.  Troponin level was monitored 

daily and did gradually improved but was not yet back to baseline by the time of

discharge.  Prior to discharge he was up and walking about without significant 

symptoms of chest pain or shortness of breath.  Activity will be as tolerated 

and he will resume his usual diet.  Follow-up appointment will be scheduled with

primary care provider within 1 week.





- Patient Instructions


Diet: Low Sodium


Activity: As Tolerated


Other/Special Instructions: Please schedule follow-up appointment with primary 

care provider within 1 week.





- Discharge Plan


*PRESCRIPTION DRUG MONITORING PROGRAM REVIEWED*: Not Applicable


*COPY OF PRESCRIPTION DRUG MONITORING REPORT IN PATIENT HODA: Not Applicable


Prescriptions/Med Rec: 


carvediloL [Coreg] 12.5 mg PO BIDAC #60 tablet


Clopidogrel [Plavix] 75 mg PO DAILY #30 tablet


Home Medications: 


                                    Home Meds





Aspirin 81 mg PO DAILY 01/15/21 [History]


Bimatoprost [Lumigan 0.01% Ophth Soln] 1 drop EYEBOTH DAILY 01/15/21 [History]


Capsaicin [Zostrix 0.025% Crm] 1 dose TOP ASDIRECTED 01/15/21 [History]


Furosemide [Lasix] 20 mg PO DAILY 01/15/21 [History]


Hydrocodone/Acetaminophen [Hydrocodon-Acetaminophen 5-325] 1 tab PO ASDIRECTED 

01/15/21 [History]


Isosorbide Mononitrate [Isosorbide Mononitrate ER] 30 mg PO DAILY 01/15/21 

[History]


Mirtazapine 15 mg PO BEDTIME 01/15/21 [History]


Nitroglycerin 0.4 mg SL ASDIRECTED 01/15/21 [History]


Omeprazole 40 mg PO DAILY 01/15/21 [History]


Ondansetron [Zofran ODT] 4 mg PO ASDIRECTED 01/15/21 [History]


Sennosides/Docusate Sodium [Senna-Docusate Sodium Tablet] 2 tab PO DAILY 

01/15/21 [History]


Tamsulosin [Flomax] 0.4 mg PO DAILY 01/15/21 [History]


Timolol Maleate/PF [Timolol Maleate 0.5% Eye Drop] 1 dose EYEBOTH BEDTIME 

01/15/21 [History]


atorvaSTATin Calcium [Atorvastatin Calcium] 80 mg PO BEDTIME 01/15/21 [History]


Clopidogrel [Plavix] 75 mg PO DAILY #30 tablet 01/18/21 [Rx]


carvediloL [Coreg] 12.5 mg PO BIDAC #60 tablet 01/18/21 [Rx]








Referrals: 


Kyree Lott NP [Ordering Only Provider] - 01/25/21 9:40 am (Please arrive 

15 minutes early to register for your appointment.  )





- Discharge Summary/Plan Comment


DC Time >30 min.: No





- Patient Data


Vitals - Most Recent: 


                                Last Vital Signs











Temp  97.4 F   01/18/21 08:00


 


Pulse  78   01/18/21 10:00


 


Resp  25 H  01/18/21 10:00


 


BP  106/67   01/18/21 10:00


 


Pulse Ox  97   01/18/21 10:00











Weight - Most Recent: 133 lb 8 oz


I&O - Last 24 hours: 


                                 Intake & Output











 01/17/21 01/18/21 01/18/21





 22:59 06:59 14:59


 


Intake Total 856 240 120


 


Output Total 350 380 


 


Balance 506 -140 120











Lab Results - Last 24 hrs: 


                         Laboratory Results - last 24 hr











  01/18/21 01/18/21 Range/Units





  05:35 05:35 


 


Potassium   4.0  (3.6-5.2)  mmol/L


 


Troponin I  0.400 H*   (0.000-0.056)  ng/mL











Med Orders - Current: 


                               Current Medications





Acetaminophen (Tylenol)  650 mg PO Q4H PRN


   PRN Reason: Pain (Mild 1-3)/fever


Hydrocodone Bitart/Acetaminophen (Norco 325-5 Mg)  1 tab PO Q4H PRN


   PRN Reason: Pain


Aspirin (Aspirin)  81 mg PO DAILY [facility]


   Last Admin: 01/18/21 09:11 Dose:  81 mg


   Documented by: 


Atorvastatin Calcium (Lipitor)  80 mg PO BEDTIME [facility]


   Last Admin: 01/17/21 21:12 Dose:  80 mg


   Documented by: 


Carvedilol (Coreg)  12.5 mg PO BIDAC [facility]


   Last Admin: 01/18/21 08:04 Dose:  12.5 mg


   Documented by: 


Clopidogrel Bisulfate (Plavix)  75 mg PO DAILY [facility]


   Last Admin: 01/18/21 09:12 Dose:  75 mg


   Documented by: 


Enoxaparin Sodium (Lovenox)  40 mg SUBCUT Q24H [facility]


   Last Admin: 01/17/21 18:23 Dose:  40 mg


   Documented by: 


Furosemide (Lasix)  20 mg PO DAILY [facility]


   Last Admin: 01/18/21 09:11 Dose:  20 mg


   Documented by: 


Haloperidol (Haldol)  2.5 mg PO Q4H PRN


   PRN Reason: Agitation


Isosorbide Mononitrate (Imdur)  30 mg PO DAILY [facility]


   Last Admin: 01/18/21 09:11 Dose:  30 mg


   Documented by: 


Latanoprost (Xalatan 0.005% Ophth Soln)  0 ml EYEBOTH BEDTIME [facility]


   Last Admin: 01/17/21 21:11 Dose:  1 drop


   Documented by: 


Magnesium Hydroxide (Milk Of Magnesia)  30 ml PO Q12H PRN


   PRN Reason: Constipation


Melatonin (Melatonin)  9 mg PO BEDTIME [facility]


   Last Admin: 01/17/21 21:13 Dose:  9 mg


   Documented by: 


Mirtazapine (Remeron)  15 mg PO BEDTIME [facility]


   Last Admin: 01/17/21 21:14 Dose:  15 mg


   Documented by: 


Morphine Sulfate (Morphine)  2 mg IVPUSH Q2H PRN


   PRN Reason: Pain (severe 7-10)


Ondansetron HCl (Zofran)  4 mg IV Q6H PRN


   PRN Reason: Nausea/Vomiting


Ondansetron HCl (Zofran Odt)  4 mg PO Q6H PRN


   PRN Reason: Nausea able to take PO


Pantoprazole Sodium (Protonix***)  40 mg PO ACBREAKFAST [facility]


   Last Admin: 01/18/21 08:05 Dose:  40 mg


   Documented by: 


Senna/Docusate Sodium (Senna Plus)  2 tab PO DAILY [facility]


   Last Admin: 01/18/21 09:12 Dose:  2 tab


   Documented by: 


Senna/Docusate Sodium (Senna Plus)  1 tab PO BID PRN


   PRN Reason: Constipation


Tamsulosin HCl (Flomax)  0.4 mg PO DAILY [facility]


   Last Admin: 01/18/21 09:11 Dose:  0.4 mg


   Documented by: 


Timolol Maleate (Timoptic 0.5% Ophth Soln)  0 ml EYEBOTH DAILY [facility]


   Last Admin: 01/18/21 09:12 Dose:  1 drop


   Documented by: 





Discontinued Medications





Aspirin (Aspirin)  243 mg PO ONETIME ONE


   Stop: 01/15/21 15:41


   Last Admin: 01/15/21 15:51 Dose:  243 mg


   Documented by: 


Carvedilol (Coreg)  6.25 mg PO ONETIME ONE


   Stop: 01/15/21 14:38


   Last Admin: 01/15/21 14:53 Dose:  6.25 mg


   Documented by: 


Carvedilol (Coreg)  6.25 mg PO ONETIME ONE


   Stop: 01/15/21 15:45


   Last Admin: 01/15/21 15:51 Dose:  6.25 mg


   Documented by: 


Carvedilol (Coreg)  6.25 mg PO BIDAC [facility]


   Last Admin: 01/16/21 08:02 Dose:  6.25 mg


   Documented by: 


Carvedilol (Coreg)  6.25 mg PO ONETIME ONE


   Stop: 01/16/21 08:16


   Last Admin: 01/16/21 08:35 Dose:  6.25 mg


   Documented by: 


Furosemide (Lasix)  40 mg IVPUSH ONETIME ONE


   Stop: 01/15/21 16:07


   Last Admin: 01/15/21 16:16 Dose:  40 mg


   Documented by: 


Heparin Sodium (Porcine) (Heparin Sodium)  4,000 units IVPUSH ONETIME ONE


   Stop: 01/15/21 15:43


   Last Admin: 01/15/21 15:54 Dose:  4,000 units


   Documented by: 


Heparin Sodium (Porcine) (Heparin Sodium)  1,000 units IVPUSH .BOLUS ONE


   Stop: 01/16/21 11:51


   Last Admin: 01/16/21 11:50 Dose:  1,000 units


   Documented by: 


Sodium Chloride (Normal Saline)  1,000 mls @ 25 mls/hr IV ASDIRECTED [facility]


   Last Admin: 01/15/21 17:38 Dose:  25 mls/hr


   Documented by: 


Heparin Sodium/Dextrose (Heparin 25,000 Units In D5w 500 Ml)  25,000 units in 

500 mls @ 15.023 mls/hr IV TITRATE [facility]; Protocol


   Last Titration: 01/17/21 17:04 Dose:  0 units/kg/hr, 0 mls/hr


   Documented by: 


Lorazepam (Ativan)  0.5 mg IVPUSH Q4H PRN


   PRN Reason: Nausea/Vomiting


   Last Admin: 01/15/21 20:35 Dose:  0.5 mg


   Documented by: 


Potassium Chloride (Klor-Con M20)  40 meq PO ONETIME ONE


   Stop: 01/16/21 08:16


   Last Admin: 01/16/21 08:35 Dose:  40 meq


   Documented by: 


Potassium Chloride (Klor-Con M20)  40 meq PO ONETIME ONE


   Stop: 01/17/21 12:41


   Last Admin: 01/17/21 13:30 Dose:  40 meq


   Documented by: 


Potassium Chloride (Klor-Con M20)  40 meq PO ONETIME ONE


   Stop: 01/17/21 17:01


   Last Admin: 01/17/21 17:03 Dose:  40 meq


   Documented by: 


Ticagrelor (Brilinta)  180 mg PO ONETIME ONE


   Stop: 01/15/21 15:44


   Last Admin: 01/15/21 15:51 Dose:  180 mg


   Documented by: 











- Exam


General: Reports: Alert, Oriented, Cooperative, No Acute Distress


Lungs: Reports: Clear to Auscultation, Normal Respiratory Effort


Cardiovascular: Reports: Regular Rate, Regular Rhythm, No Murmurs


GI/Abdominal Exam: Soft, Non-Tender, No Organomegaly, No Distention


Extremities: Non-Tender, No Pedal Edema

## 2021-04-21 ENCOUNTER — HOSPITAL ENCOUNTER (INPATIENT)
Dept: HOSPITAL 11 - JP.ED | Age: 86
LOS: 4 days | Discharge: HOME | DRG: 871 | End: 2021-04-25
Attending: INTERNAL MEDICINE | Admitting: INTERNAL MEDICINE
Payer: MEDICARE

## 2021-04-21 DIAGNOSIS — Z79.02: ICD-10-CM

## 2021-04-21 DIAGNOSIS — I50.9: ICD-10-CM

## 2021-04-21 DIAGNOSIS — R65.20: ICD-10-CM

## 2021-04-21 DIAGNOSIS — I11.0: ICD-10-CM

## 2021-04-21 DIAGNOSIS — I13.0: ICD-10-CM

## 2021-04-21 DIAGNOSIS — R86.0: ICD-10-CM

## 2021-04-21 DIAGNOSIS — Z79.82: ICD-10-CM

## 2021-04-21 DIAGNOSIS — N18.30: ICD-10-CM

## 2021-04-21 DIAGNOSIS — Z98.890: ICD-10-CM

## 2021-04-21 DIAGNOSIS — I50.22: ICD-10-CM

## 2021-04-21 DIAGNOSIS — Z66: ICD-10-CM

## 2021-04-21 DIAGNOSIS — A41.9: Primary | ICD-10-CM

## 2021-04-21 DIAGNOSIS — I25.10: ICD-10-CM

## 2021-04-21 DIAGNOSIS — J18.9: ICD-10-CM

## 2021-04-21 DIAGNOSIS — N40.0: ICD-10-CM

## 2021-04-21 DIAGNOSIS — Z93.6: ICD-10-CM

## 2021-04-21 DIAGNOSIS — N40.1: ICD-10-CM

## 2021-04-21 DIAGNOSIS — N13.8: ICD-10-CM

## 2021-04-21 DIAGNOSIS — Z20.822: ICD-10-CM

## 2021-04-21 DIAGNOSIS — K21.9: ICD-10-CM

## 2021-04-21 DIAGNOSIS — J96.01: ICD-10-CM

## 2021-04-21 DIAGNOSIS — I25.2: ICD-10-CM

## 2021-04-21 DIAGNOSIS — N30.00: ICD-10-CM

## 2021-04-21 DIAGNOSIS — Z79.899: ICD-10-CM

## 2021-04-21 DIAGNOSIS — N17.9: ICD-10-CM

## 2021-04-21 DIAGNOSIS — H54.7: ICD-10-CM

## 2021-04-21 DIAGNOSIS — N39.0: ICD-10-CM

## 2021-04-21 LAB — SARS-COV-2 RNA RESP QL NAA+PROBE: NEGATIVE

## 2021-04-21 PROCEDURE — 87086 URINE CULTURE/COLONY COUNT: CPT

## 2021-04-21 PROCEDURE — 80048 BASIC METABOLIC PNL TOTAL CA: CPT

## 2021-04-21 PROCEDURE — 0241U: CPT

## 2021-04-21 PROCEDURE — 99285 EMERGENCY DEPT VISIT HI MDM: CPT

## 2021-04-21 PROCEDURE — 81001 URINALYSIS AUTO W/SCOPE: CPT

## 2021-04-21 PROCEDURE — 97530 THERAPEUTIC ACTIVITIES: CPT

## 2021-04-21 PROCEDURE — 97116 GAIT TRAINING THERAPY: CPT

## 2021-04-21 PROCEDURE — 96365 THER/PROPH/DIAG IV INF INIT: CPT

## 2021-04-21 PROCEDURE — 83605 ASSAY OF LACTIC ACID: CPT

## 2021-04-21 PROCEDURE — 51798 US URINE CAPACITY MEASURE: CPT

## 2021-04-21 PROCEDURE — 71045 X-RAY EXAM CHEST 1 VIEW: CPT

## 2021-04-21 PROCEDURE — 87040 BLOOD CULTURE FOR BACTERIA: CPT

## 2021-04-21 PROCEDURE — 36415 COLL VENOUS BLD VENIPUNCTURE: CPT

## 2021-04-21 PROCEDURE — 85027 COMPLETE CBC AUTOMATED: CPT

## 2021-04-21 PROCEDURE — 97110 THERAPEUTIC EXERCISES: CPT

## 2021-04-21 PROCEDURE — 97162 PT EVAL MOD COMPLEX 30 MIN: CPT

## 2021-04-21 RX ADMIN — Medication SCH CAP: at 20:53

## 2021-04-21 NOTE — CR
CHEST: Portable 4/21/2021 2:31 PM

 

CLINICAL HISTORY:Fever, weakness, hypoxia

 

COMPARISON:January 2021

 

FINDINGS:  Lungs are hyperaerated. There is some underlying chronic lung

changes. Heart is enlarged. There is ill-defined infiltrate in the midlung

fields bilaterally. There are atherosclerotic changes in the aorta.

 

IMPRESSION: Bilateral infiltrates superimposed over chronic lung changes. This

may represent pneumonitis.

 

Cardiomegaly

## 2021-04-21 NOTE — PCM.HP.2
H&P History of Present Illness





- General


Date of Service: 04/21/21


Admit Problem/Dx: 


                           Admission Diagnosis/Problem





Admission Diagnosis/Problem      Bilateral pneumonia








Source of Information: Patient, Provider


History Limitations: Reports: No Limitations





- History of Present Illness


Initial Comments - Free Text/Narative: 





CC: I had a fever and other stuff





HPI: Raymond presents to the emergency room with fever, shaking chills as well as 

abdominal pain, nausea and weakness.  He felt a little bit off yesterday with 

weakness and fatigue but no specific symptoms.  He was in the [place] clinic and

had his Mercado catheter changed.  Last night in the middle of the night he woke 

up with fever and shaking chills.  He does have a mild cough but does not feel 

short of breath.  He has had some nausea and one episode of vomiting.  He 

reports an episode of severe crampy abdominal pain located near his umbilicus 

this morning.  He did try some Hellen-Vilas and this seemed to help with the pa

in.  There is no obvious trigger to make it worse.  He thinks he has been a 

little more short of breath than usual for the past month but no dramatic 

changes.  No change in bowel habits.  No skin rashes.  No sore throat, loss of 

taste or smell.





Work-up in the emergency room suggested sepsis with tachycardia and hypoxic 

respiratory failure.  X-ray suggested bilateral pneumonia.  Urinalysis suggested

infection as well.  He has received fluids per sepsis protocol and broad-

spectrum antibiotics.  He will be admitted for further management.





- Related Data


Allergies/Adverse Reactions: 


                                    Allergies











Allergy/AdvReac Type Severity Reaction Status Date / Time


 


No Known Allergies Allergy   Verified 04/21/21 15:01











Home Medications: 


                                    Home Meds





Aspirin 81 mg PO DAILY 01/15/21 [History]


Bimatoprost [Lumigan 0.01% Ophth Soln] 1 drop EYEBOTH DAILY 01/15/21 [History]


Capsaicin [Zostrix 0.025% Crm] 1 dose TOP ASDIRECTED 01/15/21 [History]


Furosemide [Lasix] 20 mg PO DAILY 01/15/21 [History]


Hydrocodone/Acetaminophen [Hydrocodon-Acetaminophen 5-325] 1 tab PO ASDIRECTED 

PRN 01/15/21 [History]


Nitroglycerin 0.4 mg SL ASDIRECTED 01/15/21 [History]


Omeprazole 40 mg PO DAILY 01/15/21 [History]


Ondansetron [Zofran ODT] 4 mg PO ASDIRECTED PRN 01/15/21 [History]


Sennosides/Docusate Sodium [Senna-Docusate Sodium Tablet] 2 tab PO DAILY 

01/15/21 [History]


Timolol Maleate/PF [Timolol Maleate 0.5% Eye Drop] 1 dose EYEBOTH BEDTIME 

01/15/21 [History]


Clopidogrel [Plavix] 75 mg PO DAILY #30 tablet 01/18/21 [Rx]


Isosorbide Mononitrate [Isosorbide Mononitrate ER] 30 mg PO DAILY #30 01/18/21 

[Rx]


Mirtazapine 15 mg PO BEDTIME #30 01/18/21 [Rx]


Tamsulosin [Flomax] 0.4 mg PO DAILY #30 01/18/21 [Rx]


atorvaSTATin Calcium [Atorvastatin Calcium] 80 mg PO BEDTIME #30 01/18/21 [Rx]


carvediloL [Coreg] 12.5 mg PO BIDAC #60 tablet 01/18/21 [Rx]


Sulfamethoxazole/Trimethoprim [Bactrim Ds Tablet] 1 each PO DAILY 04/21/21 

[History]











Past Medical History


HEENT History: Reports: Impaired Vision


Cardiovascular History: Reports: CAD, Heart Failure, Hypertension, MI


Gastrointestinal History: Reports: GERD


Genitourinary History: Reports: BPH, Other (See Below)


Other Genitourinary History: Chronic mercado cath


Musculoskeletal History: Reports: Fracture, Other (See Below)


Other Musculoskeletal History: shoulder dislocation





- Past Surgical History


HEENT Surgical History: Reports: Tonsillectomy


GI Surgical History: Reports: Hernia Repair/Other





Social & Family History





- Family History


Cardiac: Denies: CAD





- Tobacco Use


Tobacco Use Status *Q: Current Every Day Tobacco User


Years of Tobacco use: 70


Packs/Tins Daily: 0.5


Second Hand Smoke Exposure: No





- Caffeine Use


Caffeine Use: Reports: Coffee


Other Caffeine Use: 3 cpd





- Alcohol Use


Alcohol Use History: No





- Recreational Drug Use


Recreational Drug Use: Yes





H&P Review of Systems





- Review of Systems:


Review Of Systems: See Below


Free Text/Narrative: 





A complete 12 point review of systems was obtained.  Pertinent positives and 

negatives are noted in the history of present illness.  All other systems were 

reviewed and were negative except as noted.





Exam





- Exam


Exam: See Below





- Vital Signs


Vital Signs: 


                                Last Vital Signs











Temp  37.7 C   04/21/21 15:25


 


Pulse  104 H  04/21/21 15:51


 


Resp  19   04/21/21 15:51


 


BP  115/46 L  04/21/21 15:51


 


Pulse Ox  92 L  04/21/21 15:51











Weight: 48.988 kg





- Exam


Quality Assessment: Supplemental Oxygen


General: Alert, Oriented, Cooperative.  No: Mild Distress


HEENT: Conjunctiva Clear.  No: Mucosa Moist & Pink (dry), Scleral Icterus


Neck: Supple, Trachea Midline.  No: JVD


Lungs: Normal Respiratory Effort, Crackles (both lower and mid lungs L>R)


Cardiovascular: Regular Rhythm, Tachycardia.  No: Systolic Murmur


GI/Abdominal Exam: Normal Bowel Sounds, Soft, Non-Tender, No Distention


Extremities: Normal Capillary Refill.  No: Pedal Edema, Increased Warmth


Peripheral Pulses: 2+: Dorsalis Pedis (L), Dorsalis Pedis (R)


Skin: Warm, Dry.  No: Rash


Neuro Extensive - Mental Status: Alert, Oriented x3, Nl Response to Commands


Neuro Extensive - Motor, Sensory, Reflexes: No: Dysarthria, Abnormal Motor, 

Tremor


Psychiatric: Alert, Normal Affect





- Patient Data


Lab Results Last 24 hrs: 


                         Laboratory Results - last 24 hr











  04/21/21 04/21/21 04/21/21 Range/Units





  14:19 14:19 14:19 


 


WBC  8.4    (4.5-11.0)  K/uL


 


RBC  3.68 L    (4.30-5.90)  M/uL


 


Hgb  11.9 L    (12.0-15.0)  g/dL


 


Hct  36.2 L    (40.0-54.0)  %


 


MCV  98    (80-98)  fL


 


MCH  32 H    (27-31)  pg


 


MCHC  33    (32-36)  %


 


Plt Count  158    (150-400)  K/uL


 


Sodium   141   (140-148)  mmol/L


 


Potassium   3.6   (3.6-5.2)  mmol/L


 


Chloride   106   (100-108)  mmol/L


 


Carbon Dioxide   23   (21-32)  mmol/L


 


Anion Gap   11.7   (5.0-14.0)  mmol/L


 


BUN   30 H   (7-18)  mg/dL


 


Creatinine   1.2   (0.8-1.3)  mg/dL


 


Est Cr Clr Drug Dosing   31.18   mL/min


 


Estimated GFR (MDRD)   58 L   (>60)  


 


Glucose   112 H   ()  mg/dL


 


Lactic Acid    1.9  (0.4-2.0)  mmol/L


 


Calcium   9.1   (8.5-10.1)  mg/dL


 


Urine Color     (YELLOW)  


 


Urine Appearance     (CLEAR)  


 


Urine pH     (5.0-8.0)  


 


Ur Specific Gravity     (1.008-1.030)  


 


Urine Protein     (NEGATIVE)  mg/dL


 


Urine Glucose (UA)     (NEGATIVE)  mg/dL


 


Urine Ketones     (NEGATIVE)  mg/dL


 


Urine Occult Blood     (NEGATIVE)  


 


Urine Nitrite     (NEGATIVE)  


 


Urine Bilirubin     (NEGATIVE)  


 


Urine Urobilinogen     (0.2-1.0)  EU/dL


 


Ur Leukocyte Esterase     (NEGATIVE)  


 


Urine RBC     (0-5)  


 


Urine WBC     (0-5)  


 


Ur Epithelial Cells     


 


Amorphous Sediment     


 


Urine Bacteria     


 


Urine Mucus     


 


Influenza Type A RNA     (NEGATIVE)  


 


RSV RNA (INAAT)     (NEGATIVE)  


 


Influenza Type B RNA     (NEGATIVE)  


 


SARS-CoV-2 RNA (LARRY)     (NEGATIVE)  














  04/21/21 04/21/21 Range/Units





  15:03 15:36 


 


WBC    (4.5-11.0)  K/uL


 


RBC    (4.30-5.90)  M/uL


 


Hgb    (12.0-15.0)  g/dL


 


Hct    (40.0-54.0)  %


 


MCV    (80-98)  fL


 


MCH    (27-31)  pg


 


MCHC    (32-36)  %


 


Plt Count    (150-400)  K/uL


 


Sodium    (140-148)  mmol/L


 


Potassium    (3.6-5.2)  mmol/L


 


Chloride    (100-108)  mmol/L


 


Carbon Dioxide    (21-32)  mmol/L


 


Anion Gap    (5.0-14.0)  mmol/L


 


BUN    (7-18)  mg/dL


 


Creatinine    (0.8-1.3)  mg/dL


 


Est Cr Clr Drug Dosing    mL/min


 


Estimated GFR (MDRD)    (>60)  


 


Glucose    ()  mg/dL


 


Lactic Acid    (0.4-2.0)  mmol/L


 


Calcium    (8.5-10.1)  mg/dL


 


Urine Color  Yellow   (YELLOW)  


 


Urine Appearance  Clear   (CLEAR)  


 


Urine pH  5.5   (5.0-8.0)  


 


Ur Specific Gravity  >= 1.030   (1.008-1.030)  


 


Urine Protein  100 H   (NEGATIVE)  mg/dL


 


Urine Glucose (UA)  Negative   (NEGATIVE)  mg/dL


 


Urine Ketones  Negative   (NEGATIVE)  mg/dL


 


Urine Occult Blood  Moderate H   (NEGATIVE)  


 


Urine Nitrite  Negative   (NEGATIVE)  


 


Urine Bilirubin  Small H   (NEGATIVE)  


 


Urine Urobilinogen  0.2   (0.2-1.0)  EU/dL


 


Ur Leukocyte Esterase  Moderate H   (NEGATIVE)  


 


Urine RBC  Semi-packed H   (0-5)  


 


Urine WBC  30-40 H   (0-5)  


 


Ur Epithelial Cells  Few   


 


Amorphous Sediment  Not seen   


 


Urine Bacteria  Many   


 


Urine Mucus  Not seen   


 


Influenza Type A RNA   Negative  (NEGATIVE)  


 


RSV RNA (INAAT)   Negative  (NEGATIVE)  


 


Influenza Type B RNA   Negative  (NEGATIVE)  


 


SARS-CoV-2 RNA (LARRY)   Negative  (NEGATIVE)  











Result Diagrams: 


                                 04/21/21 14:19





                                 04/21/21 14:19


Imaging Impressions Last 24 hrs: 


CXR-images personally reviewed-there are patchy bilateral infiltrates in the 

midlung area. Heart size is normal. No mass. No effusions. 





Sepsis Event Note





- Evaluation


Sepsis Screening Result: Sepsis Risk





- Focused Exam


Vital Signs: 


                                   Vital Signs











  Temp Temp Pulse Resp BP Pulse Ox


 


 04/21/21 15:51    104 H  19  115/46 L  92 L


 


 04/21/21 15:25   37.7 C  117 H  26 H  131/67  93 L


 


 04/21/21 14:33   38.3 C H  132 H  22 H  154/89 H  96


 


 04/21/21 14:29  38.3 C H     


 


 04/21/21 13:54   38.3 C H  132 H  22 H  154/89 H  92 L














*Q Meaningful Use (ADM)





- VTE *Q


VTE Pharmacological Contraindications *Q: Risk of Bleeding (hematuria yesterday)





- VTE Risk Assess *Q


Each Risk Factor Represents 1 Point: Sepsis, Serious lung disease including 

pneumonia


Total Score 1 Point Risk Factors: 2


Each Risk Factor Represents 2 Points: None


Total Score 2 Point Risk Factors: 0


Each Risk Factor Represents 3 Points: Age 75 Years or Greater


Total Score 3 Point Risk Factors: 3


Each Risk Factor Represents 5 Points: None


Total Score 5 Point Risk Factors: 0


Venous Thromboembolism Risk Factor Score *Q: 5





- Problem List


(1) Pneumonia


SNOMED Code(s): 465010212


   ICD Code: J18.9 - PNEUMONIA, UNSPECIFIED ORGANISM   Status: Acute   Current 

Visit: Yes   


Qualifiers: 


   Pneumonia type: due to unspecified organism   Laterality: bilateral   Lung 

location: unspecified part of lung   Qualified Code(s): J18.9 - Pneumonia, 

unspecified organism   





(2) Sepsis


SNOMED Code(s): 80978562


   ICD Code: A41.9 - SEPSIS, UNSPECIFIED ORGANISM   Status: Acute   Current 

Visit: Yes   


Qualifiers: 


   Sepsis type: sepsis due to unspecified organism   Sepsis acute organ 

dysfunction status: with acute organ dysfunction   Severe sepsis acute organ 

dysfunction type: acute respiratory failure   Acute respiratory failure type: 

with hypoxia   Severe sepsis shock status: without septic shock   Qualified 

Code(s): A41.9 - Sepsis, unspecified organism; R65.20 - Severe sepsis without 

septic shock; J96.01 - Acute respiratory failure with hypoxia   





(3) UTI (urinary tract infection)


SNOMED Code(s): 14127213


   ICD Code: N39.0 - URINARY TRACT INFECTION, SITE NOT SPECIFIED   Status: Acute

   Current Visit: Yes   


Qualifiers: 


   Urinary tract infection type: acute cystitis   Hematuria presence: without 

hematuria   Qualified Code(s): N30.00 - Acute cystitis without hematuria   





(4) Systolic congestive heart failure


SNOMED Code(s): 14621626, 081374694


   ICD Code: I50.20 - UNSPECIFIED SYSTOLIC (CONGESTIVE) HEART FAILURE   Status: 

Chronic   Current Visit: No   


Qualifiers: 


   Heart failure chronicity: chronic   Qualified Code(s): I50.22 - Chronic sy

stolic (congestive) heart failure   





(5) BPH with urinary obstruction


SNOMED Code(s): 331014145


   ICD Code: N40.1 - BENIGN PROSTATIC HYPERPLASIA WITH LOWER URINARY TRACT SYMP;

 N13.8 - OTHER OBSTRUCTIVE AND REFLUX UROPATHY   Status: Chronic   Current 

Visit: No   


Problem List Initiated/Reviewed/Updated: Yes


Orders Last 24hrs: 


                               Active Orders 24 hr











 Category Date Time Status


 


 Patient Status Manage Transfer [TRANSFER] Routine ADT  04/21/21 16:17 Ordered


 


 CULTURE BLOOD [BC] Stat Lab  04/21/21 14:19 Received


 


 CULTURE BLOOD [BC] Stat Lab  04/21/21 15:26 Received


 


 CULTURE URINE [RM] Stat Lab  04/21/21 15:35 Received


 


 Lactated Ringers [Ringers, Lactated] 1,000 ml Med  04/21/21 15:30 Active





 IV ASDIRECTED   


 


 Levofloxacin/Dextrose 5%-Water [Levaquin in D5W 750 MG/ Med  04/21/21 15:27 

Active





 150 ML] 750 mg   





 Premix Bag 1 bag   





 IV ONETIME   


 


 Sodium Chloride 0.9% [Saline Flush] Med  04/21/21 14:02 Active





 10 ml FLUSH ASDIRECTED PRN   


 


 Resuscitation Status Routine Resus Stat  04/21/21 16:21 Ordered








                                Medication Orders





Levofloxacin/Dextrose 750 mg/ (Premix)  150 mls @ 100 mls/hr IV ONETIME ONE


   Stop: 04/21/21 16:56


   Last Admin: 04/21/21 15:40  Dose: 100 mls/hr


   Documented by: MARISELA


Lactated Ringer's (Ringers, Lactated)  1,000 mls @ 150 mls/hr IV ASDIRECTED ANGELI


   Last Admin: 04/21/21 15:38  Dose: 150 mls/hr


   Documented by: MARISELA


Sodium Chloride (Sodium Chloride 0.9% 10 Ml Syringe)  10 ml FLUSH ASDIRECTED PRN


   PRN Reason: Keep Vein Open


   Last Admin: 04/21/21 14:33  Dose: 10 ml


   Documented by: MARISELA








Assessment/Plan Comment:: 





ASSESSMENT AND PLAN - 





Bilateral pneumonia-complicated by acute respiratory failure with hypoxia and 

sepsis.  Minimal symptoms   Prior to admission but obvious infiltrates on chest 

x-ray.  Currently requiring 3 L.  He has received levofloxacin.


-Continue levofloxacin every 48 hours


-Supplement oxygen as needed


-Follow-up cultures





Acute cystitis, complicated UTI-patient had Mercado catheter changed yesterday and

there is no evidence for infection at that time.  Today has many bacteria, 

positive leukocyte esterase and many white blood cells.  History of BPH and has 

had longstanding Mercado catheter use.


-Levofloxacin as above


-Follow-up urine culture





Systolic congestive heart failure-chronic and stable.


-Continue medical management





BPH with obstruction-patient has had a Mercado catheter for some time and this was

changed yesterday.  Now he has evidence for infection as discussed above.  He 

did have hematuria yesterday that led to obstruction of his Mercado catheter 

requiring a change.


-Continue home medications





Maintenance issues - 


-DVT prophylaxis-mechanical with hematuria yesterday


-GI prophylaxis-not indicated


-Nutrition-regular


-Mercado catheter-chronic indwelling





CODE STATUS -DNR/DNI





Admission justification -this patient will be admitted for inpatient services 

and is medically appropriate meeting medical necessity for inpatient admission 

as outlined in my documentation.  I reasonably expect the patient will require 

inpatient services that span a period time over 2 midnights. I reasonably expect

this patient to be discharged or transferred within 96 hours after admission to 

the [facility].





Disposition -I anticipate discharge home after the hospital stay





Primary care physician - Dr Kyree Forrest M.D.





- Mortality Measure


Prognosis:: Good

## 2021-04-22 RX ADMIN — TIMOLOL MALEATE SCH DROP: 5 SOLUTION OPHTHALMIC at 09:20

## 2021-04-22 RX ADMIN — Medication SCH CAP: at 20:16

## 2021-04-22 RX ADMIN — Medication SCH CAP: at 09:19

## 2021-04-22 NOTE — PCM.PN
- General Info


Date of Service: 04/22/21


Admission Dx/Problem (Free Text): 


                           Admission Diagnosis/Problem





Admission Diagnosis/Problem      Bilateral pneumonia








Functional Status: Reports: Pain Controlled





- Review of Systems


General: Denies: Fever


HEENT: Reports: No Symptoms


Pulmonary: Reports: No Symptoms


Cardiovascular: Reports: No Symptoms


Gastrointestinal: Reports: No Symptoms


Genitourinary: Reports: No Symptoms


Musculoskeletal: Reports: No Symptoms


Skin: Reports: No Symptoms


Neurological: Reports: No Symptoms


Psychiatric: Reports: No Symptoms





- Patient Data


Vitals - Most Recent: 


                                Last Vital Signs











Temp  97.1 F   04/22/21 14:00


 


Pulse  87   04/22/21 14:00


 


Resp  18   04/22/21 14:00


 


BP  95/57 L  04/22/21 14:00


 


Pulse Ox  93 L  04/22/21 14:00











Weight - Most Recent: 139 lb 9.582 oz


I&O - Last 24 Hours: 


                                 Intake & Output











 04/21/21 04/22/21 04/22/21





 22:59 06:59 14:59


 


Intake Total 607 1350 600


 


Output Total 300 160 100


 


Balance 307 1190 500











Lab Results Last 24 Hours: 


                         Laboratory Results - last 24 hr











  04/21/21 04/21/21 04/21/21 Range/Units





  14:19 14:19 15:03 


 


WBC     (4.5-11.0)  K/uL


 


RBC     (4.30-5.90)  M/uL


 


Hgb     (12.0-15.0)  g/dL


 


Hct     (40.0-54.0)  %


 


MCV     (80-98)  fL


 


MCH     (27-31)  pg


 


MCHC     (32-36)  %


 


Plt Count     (150-400)  K/uL


 


Sodium  141    (140-148)  mmol/L


 


Potassium  3.6    (3.6-5.2)  mmol/L


 


Chloride  106    (100-108)  mmol/L


 


Carbon Dioxide  23    (21-32)  mmol/L


 


Anion Gap  11.7    (5.0-14.0)  mmol/L


 


BUN  30 H    (7-18)  mg/dL


 


Creatinine  1.2    (0.8-1.3)  mg/dL


 


Est Cr Clr Drug Dosing  31.18    mL/min


 


Estimated GFR (MDRD)  58 L    (>60)  


 


Glucose  112 H    ()  mg/dL


 


Lactic Acid   1.9   (0.4-2.0)  mmol/L


 


Calcium  9.1    (8.5-10.1)  mg/dL


 


Urine Color    Yellow  (YELLOW)  


 


Urine Appearance    Clear  (CLEAR)  


 


Urine pH    5.5  (5.0-8.0)  


 


Ur Specific Gravity    >= 1.030  (1.008-1.030)  


 


Urine Protein    100 H  (NEGATIVE)  mg/dL


 


Urine Glucose (UA)    Negative  (NEGATIVE)  mg/dL


 


Urine Ketones    Negative  (NEGATIVE)  mg/dL


 


Urine Occult Blood    Moderate H  (NEGATIVE)  


 


Urine Nitrite    Negative  (NEGATIVE)  


 


Urine Bilirubin    Small H  (NEGATIVE)  


 


Urine Urobilinogen    0.2  (0.2-1.0)  EU/dL


 


Ur Leukocyte Esterase    Moderate H  (NEGATIVE)  


 


Urine RBC    Semi-packed H  (0-5)  


 


Urine WBC    30-40 H  (0-5)  


 


Ur Epithelial Cells    Few  


 


Amorphous Sediment    Not seen  


 


Urine Bacteria    Many  


 


Urine Mucus    Not seen  


 


Influenza Type A RNA     (NEGATIVE)  


 


RSV RNA (INAAT)     (NEGATIVE)  


 


Influenza Type B RNA     (NEGATIVE)  


 


SARS-CoV-2 RNA (LARRY)     (NEGATIVE)  














  04/21/21 04/22/21 04/22/21 Range/Units





  15:36 06:15 06:15 


 


WBC   7.9   (4.5-11.0)  K/uL


 


RBC   2.77 L   (4.30-5.90)  M/uL


 


Hgb   8.8 L D   (12.0-15.0)  g/dL


 


Hct   27.7 L   (40.0-54.0)  %


 


MCV   100 H   (80-98)  fL


 


MCH   32 H   (27-31)  pg


 


MCHC   32   (32-36)  %


 


Plt Count   128 L   (150-400)  K/uL


 


Sodium    141  (140-148)  mmol/L


 


Potassium    4.4  (3.6-5.2)  mmol/L


 


Chloride    108  (100-108)  mmol/L


 


Carbon Dioxide    23  (21-32)  mmol/L


 


Anion Gap    9.6  (5.0-14.0)  mmol/L


 


BUN    34 H  (7-18)  mg/dL


 


Creatinine    1.4 H  (0.8-1.3)  mg/dL


 


Est Cr Clr Drug Dosing    34.55  mL/min


 


Estimated GFR (MDRD)    48 L  (>60)  


 


Glucose    98  ()  mg/dL


 


Lactic Acid     (0.4-2.0)  mmol/L


 


Calcium    8.1 L  (8.5-10.1)  mg/dL


 


Urine Color     (YELLOW)  


 


Urine Appearance     (CLEAR)  


 


Urine pH     (5.0-8.0)  


 


Ur Specific Gravity     (1.008-1.030)  


 


Urine Protein     (NEGATIVE)  mg/dL


 


Urine Glucose (UA)     (NEGATIVE)  mg/dL


 


Urine Ketones     (NEGATIVE)  mg/dL


 


Urine Occult Blood     (NEGATIVE)  


 


Urine Nitrite     (NEGATIVE)  


 


Urine Bilirubin     (NEGATIVE)  


 


Urine Urobilinogen     (0.2-1.0)  EU/dL


 


Ur Leukocyte Esterase     (NEGATIVE)  


 


Urine RBC     (0-5)  


 


Urine WBC     (0-5)  


 


Ur Epithelial Cells     


 


Amorphous Sediment     


 


Urine Bacteria     


 


Urine Mucus     


 


Influenza Type A RNA  Negative    (NEGATIVE)  


 


RSV RNA (INAAT)  Negative    (NEGATIVE)  


 


Influenza Type B RNA  Negative    (NEGATIVE)  


 


SARS-CoV-2 RNA (LARRY)  Negative    (NEGATIVE)  











[facility] Results Last 24 Hours: 


                                  Microbiology











 04/21/21 14:19 Aerobic Blood Culture - Preliminary





 Blood - Venous    NO GROWTH AFTER 1 DAY





 Anaerobic Blood Culture - Preliminary





    NO GROWTH AFTER 1 DAY











Med Orders - Current: 


                               Current Medications





Acetaminophen (Acetaminophen 325 Mg Tab)  650 mg PO Q4H PRN


   PRN Reason: Pain (Mild 1-3)/fever


   Last Admin: 04/22/21 07:30 Dose:  650 mg


   Documented by: 


Hydrocodone Bitart/Acetaminophen (Acetaminophen/Hydrocodone 325-5 Mg Tab)  1 tab

PO Q4H PRN


   PRN Reason: Pain (moderate 4-6)


Albuterol (Albuterol 0.083% 2.5 Mg/3 Ml Neb Soln)  2.5 mg NEB Q4H PRN


   PRN Reason: Shortness Of Breath/wheezing


Aspirin (Aspirin 81 Mg Tab.Chew)  81 mg PO DAILY [facility]


   Last Admin: 04/22/21 09:19 Dose:  81 mg


   Documented by: 


Atorvastatin Calcium (Atorvastatin 20 Mg Tab)  80 mg PO BEDTIME [facility]


   Last Admin: 04/21/21 20:53 Dose:  80 mg


   Documented by: 


Capsaicin (Capsaicin 0.025% Crm 60 Gm Tube)  0 gm TOP Q2H PRN


   PRN Reason: joint pain


Carvedilol (Carvedilol 12.5 Mg Tab)  6.25 mg PO BID [facility]


Clopidogrel Bisulfate (Clopidogrel 75 Mg Tab)  75 mg PO DAILY [facility]


   Last Admin: 04/22/21 09:19 Dose:  75 mg


   Documented by: 


Sodium Chloride (Normal Saline)  1,000 mls @ 100 mls/hr IV ASDIRECTED [facility]


   Last Admin: 04/22/21 09:18 Dose:  100 mls/hr


   Documented by: 


Levofloxacin/Dextrose 750 mg/ (Premix)  150 mls @ 100 mls/hr IV Q48H [facility]


Sodium Chloride (Normal Saline)  1,000 mls @ 75 mls/hr IV ASDIRECTED [facility]


Isosorbide Mononitrate (Isosorbide Mononitrate 30 Mg Tab.Er)  30 mg PO DAILY [facility]


   Last Admin: 04/22/21 10:17 Dose:  30 mg


   Documented by: 


Lactobacillus Rhamnosus (Lactobacillus Rhamnosus Gg (Probiotic) Cap)  1 cap PO 

BID [facility]


   Last Admin: 04/22/21 09:19 Dose:  1 cap


   Documented by: 


Latanoprost (Latanoprost 0.005% Ophth Soln 2.5 Ml Bottle)  0 ml EYEBOTH BEDTIME 

[facility]


   Last Admin: 04/21/21 20:53 Dose:  1 drop


   Documented by: 


Lorazepam (Lorazepam 2 Mg/Ml Sdv)  0.5 mg IVPUSH Q4H PRN


   PRN Reason: Nausea/Vomiting


Magnesium Hydroxide (Magnesium Hydroxide 400 Mg/5 Ml Susp 30 Ml Cup)  30 ml PO 

Q12H PRN


   PRN Reason: Constipation


Melatonin (Melatonin 3 Mg Tab)  9 mg PO BEDTIME [facility]


   Last Admin: 04/21/21 20:53 Dose:  9 mg


   Documented by: 


Mirtazapine (Mirtazapine 15 Mg Tab)  15 mg PO BEDTIME [facility]


   Last Admin: 04/21/21 20:53 Dose:  15 mg


   Documented by: 


Ondansetron HCl (Ondansetron 4 Mg/2 Ml Sdv)  4 mg IV Q6H PRN


   PRN Reason: Nausea/Vomiting


Ondansetron HCl (Ondansetron 4 Mg Tab.Dis)  4 mg PO Q6H PRN


   PRN Reason: Nausea able to take PO


Pantoprazole Sodium (Pantoprazole 40 Mg Tab.Cr)  40 mg PO ACBREAKFAST [facility]


   Last Admin: 04/22/21 07:27 Dose:  40 mg


   Documented by: 


Senna/Docusate Sodium (Docusate Sodium/Sennosides 50-8.6 Mg Tab)  2 tab PO DAILY

[facility]


   Last Admin: 04/22/21 09:19 Dose:  2 tab


   Documented by: 


Senna/Docusate Sodium (Docusate Sodium/Sennosides 50-8.6 Mg Tab)  1 tab PO BID 

PRN


   PRN Reason: Constipation


Sodium Chloride (Sodium Chloride 0.9% 10 Ml Syringe)  10 ml FLUSH ASDIRECTED PRN


   PRN Reason: Keep Vein Open


   Last Admin: 04/21/21 14:33 Dose:  10 ml


   Documented by: 


Sodium Chloride (Sodium Chloride 0.9% 10 Ml Sdv)  1,000 ml IV ONETIME ONE


   Stop: 04/22/21 14:46


Tamsulosin HCl (Tamsulosin 0.4 Mg Cap.Er)  0.4 mg PO DAILY [facility]


   Last Admin: 04/22/21 09:19 Dose:  0.4 mg


   Documented by: 


Timolol Maleate (Timolol Maleate 0.5% Ophth Soln 5 Ml Bottle)  0 ml EYEBOTH 

DAILY [facility]


   Last Admin: 04/22/21 09:20 Dose:  1 drop


   Documented by: 





Discontinued Medications





Acetaminophen (Acetaminophen 325 Mg Tab)  650 mg PO NOW ONE


   Stop: 04/21/21 14:08


   Last Admin: 04/21/21 14:29 Dose:  650 mg


   Documented by: 


Carvedilol (Carvedilol 12.5 Mg Tab)  12.5 mg PO BIDAC [facility]


   Last Admin: 04/22/21 10:17 Dose:  12.5 mg


   Documented by: 


Lactated Ringer's (Ringers, Lactated)  1,000 mls @ 1,000 mls/hr IV BOLUS ONE


   Stop: 04/21/21 15:07


   Last Admin: 04/21/21 14:15 Dose:  1,000 mls/hr


   Documented by: 


Levofloxacin/Dextrose 750 mg/ (Premix)  150 mls @ 100 mls/hr IV ONETIME ONE


   Stop: 04/21/21 16:56


   Last Admin: 04/21/21 15:40 Dose:  100 mls/hr


   Documented by: 


Lactated Ringer's (Ringers, Lactated)  1,000 mls @ 150 mls/hr IV ASDIRECTED [facility]


   Last Admin: 04/21/21 15:38 Dose:  150 mls/hr


   Documented by: 


Sodium Chloride (Normal Saline)  500 mls @ 250 mls/hr IV .BOLUS ONE


   Stop: 04/22/21 05:53


   Last Admin: 04/22/21 04:01 Dose:  250 mls/hr


   Documented by: 


Potassium Chloride (Potassium Chloride 20 Meq Tab.Er)  40 meq PO ONETIME ONE


   Stop: 04/21/21 17:31


   Last Admin: 04/21/21 17:22 Dose:  40 meq


   Documented by: 


Timolol Maleate (Timolol Maleate 0.5% Ophth Soln 5 Ml Bottle)  0 ml EYEBOTH 

BEDTIME [facility]


   Last Admin: 04/21/21 21:35 Dose:  Not Given


   Documented by: 











- Exam


General: Alert, Oriented


HEENT: No: Scleral Icterus


Neck: Supple


Lungs: Crackles


Cardiovascular: Regular Rate, Regular Rhythm


GI/Abdominal Exam: Normal Bowel Sounds


Extremities: Normal Inspection


Skin: Warm, Dry


Neurological: No New Focal Deficit


Psy/Mental Status: Alert, Normal Affect, Normal Mood





- Patient Data


Lab Results Last 24 hrs: 


                         Laboratory Results - last 24 hr











  04/21/21 04/21/21 04/21/21 Range/Units





  14:19 14:19 15:03 


 


WBC     (4.5-11.0)  K/uL


 


RBC     (4.30-5.90)  M/uL


 


Hgb     (12.0-15.0)  g/dL


 


Hct     (40.0-54.0)  %


 


MCV     (80-98)  fL


 


MCH     (27-31)  pg


 


MCHC     (32-36)  %


 


Plt Count     (150-400)  K/uL


 


Sodium  141    (140-148)  mmol/L


 


Potassium  3.6    (3.6-5.2)  mmol/L


 


Chloride  106    (100-108)  mmol/L


 


Carbon Dioxide  23    (21-32)  mmol/L


 


Anion Gap  11.7    (5.0-14.0)  mmol/L


 


BUN  30 H    (7-18)  mg/dL


 


Creatinine  1.2    (0.8-1.3)  mg/dL


 


Est Cr Clr Drug Dosing  31.18    mL/min


 


Estimated GFR (MDRD)  58 L    (>60)  


 


Glucose  112 H    ()  mg/dL


 


Lactic Acid   1.9   (0.4-2.0)  mmol/L


 


Calcium  9.1    (8.5-10.1)  mg/dL


 


Urine Color    Yellow  (YELLOW)  


 


Urine Appearance    Clear  (CLEAR)  


 


Urine pH    5.5  (5.0-8.0)  


 


Ur Specific Gravity    >= 1.030  (1.008-1.030)  


 


Urine Protein    100 H  (NEGATIVE)  mg/dL


 


Urine Glucose (UA)    Negative  (NEGATIVE)  mg/dL


 


Urine Ketones    Negative  (NEGATIVE)  mg/dL


 


Urine Occult Blood    Moderate H  (NEGATIVE)  


 


Urine Nitrite    Negative  (NEGATIVE)  


 


Urine Bilirubin    Small H  (NEGATIVE)  


 


Urine Urobilinogen    0.2  (0.2-1.0)  EU/dL


 


Ur Leukocyte Esterase    Moderate H  (NEGATIVE)  


 


Urine RBC    Semi-packed H  (0-5)  


 


Urine WBC    30-40 H  (0-5)  


 


Ur Epithelial Cells    Few  


 


Amorphous Sediment    Not seen  


 


Urine Bacteria    Many  


 


Urine Mucus    Not seen  


 


Influenza Type A RNA     (NEGATIVE)  


 


RSV RNA (INAAT)     (NEGATIVE)  


 


Influenza Type B RNA     (NEGATIVE)  


 


SARS-CoV-2 RNA (LARRY)     (NEGATIVE)  














  04/21/21 04/22/21 04/22/21 Range/Units





  15:36 06:15 06:15 


 


WBC   7.9   (4.5-11.0)  K/uL


 


RBC   2.77 L   (4.30-5.90)  M/uL


 


Hgb   8.8 L D   (12.0-15.0)  g/dL


 


Hct   27.7 L   (40.0-54.0)  %


 


MCV   100 H   (80-98)  fL


 


MCH   32 H   (27-31)  pg


 


MCHC   32   (32-36)  %


 


Plt Count   128 L   (150-400)  K/uL


 


Sodium    141  (140-148)  mmol/L


 


Potassium    4.4  (3.6-5.2)  mmol/L


 


Chloride    108  (100-108)  mmol/L


 


Carbon Dioxide    23  (21-32)  mmol/L


 


Anion Gap    9.6  (5.0-14.0)  mmol/L


 


BUN    34 H  (7-18)  mg/dL


 


Creatinine    1.4 H  (0.8-1.3)  mg/dL


 


Est Cr Clr Drug Dosing    34.55  mL/min


 


Estimated GFR (MDRD)    48 L  (>60)  


 


Glucose    98  ()  mg/dL


 


Lactic Acid     (0.4-2.0)  mmol/L


 


Calcium    8.1 L  (8.5-10.1)  mg/dL


 


Urine Color     (YELLOW)  


 


Urine Appearance     (CLEAR)  


 


Urine pH     (5.0-8.0)  


 


Ur Specific Gravity     (1.008-1.030)  


 


Urine Protein     (NEGATIVE)  mg/dL


 


Urine Glucose (UA)     (NEGATIVE)  mg/dL


 


Urine Ketones     (NEGATIVE)  mg/dL


 


Urine Occult Blood     (NEGATIVE)  


 


Urine Nitrite     (NEGATIVE)  


 


Urine Bilirubin     (NEGATIVE)  


 


Urine Urobilinogen     (0.2-1.0)  EU/dL


 


Ur Leukocyte Esterase     (NEGATIVE)  


 


Urine RBC     (0-5)  


 


Urine WBC     (0-5)  


 


Ur Epithelial Cells     


 


Amorphous Sediment     


 


Urine Bacteria     


 


Urine Mucus     


 


Influenza Type A RNA  Negative    (NEGATIVE)  


 


RSV RNA (INAAT)  Negative    (NEGATIVE)  


 


Influenza Type B RNA  Negative    (NEGATIVE)  


 


SARS-CoV-2 RNA (LARRY)  Negative    (NEGATIVE)  











Result Diagrams: 


                                 04/22/21 06:15





                                 04/22/21 06:15


[facility] Results Last 24 hrs: 


                                  Microbiology











 04/21/21 14:19 Aerobic Blood Culture - Preliminary





 Blood - Venous    NO GROWTH AFTER 1 DAY





 Anaerobic Blood Culture - Preliminary





    NO GROWTH AFTER 1 DAY














Sepsis Event Note





- Evaluation


Sepsis Screening Result: No Definite Risk





- Focused Exam


Vital Signs: 


                                   Vital Signs











  Temp Temp Pulse Pulse Resp BP BP


 


 04/22/21 14:00   97.1 F   87  18   95/57 L


 


 04/22/21 11:30   96.2 F L   74  18   92/58 L


 


 04/22/21 10:27   97.8 F   72  18   85/40 L


 


 04/22/21 10:17    83    110/63 


 


 04/22/21 08:00  97.2 F      


 


 04/22/21 07:30  101 F H      


 


 04/22/21 07:24   101 F H   83  18   110/63


 


 04/22/21 02:58   98.8 F   90  18   109/54 L














  Pulse Ox


 


 04/22/21 14:00  93 L


 


 04/22/21 11:30  99


 


 04/22/21 10:27  99


 


 04/22/21 10:17 


 


 04/22/21 08:00 


 


 04/22/21 07:30 


 


 04/22/21 07:24  95


 


 04/22/21 02:58  91 L














- Problem List Review


Problem List Initiated/Reviewed/Updated: Yes





- My Orders


Last 24 Hours: 


My Active Orders





04/22/21 14:40


BASIC METABOLIC PANEL,BMP [CHEM] Routine 





04/22/21 14:41


CBC W/O DIFF,HEMOGRAM [HEME] Routine 





04/22/21 14:45


Sodium Chloride 0.9% [Normal Saline]   1,000 ml IV ONETIME ONE 


Sodium Chloride 0.9% [Normal Saline] 1,000 ml IV ASDIRECTED 





04/22/21 21:00


carvediloL [Coreg]   6.25 mg PO BID 














- Plan


Plan:: 





ASSESSMENT AND PLAN - 





Bilateral pneumonia-respiratory symptoms appear to have improved. Continued 

oxygen requirement.


-Continue levofloxacin every 48 hours


-Supplement oxygen as needed


-Follow-up cultures





Acute cystitis, complicated UTI-it appears a urine culture was ordered yesterday

 though there is no report on it yet.


-Levofloxacin as above


-Follow-up urine culture





Systolic congestive heart failure-chronic and stable.


No evidence of acute heart failure. In fact, the patient has had decreased urine

 output and oral intake.


Requested IV fluid bolus and will continue maintenance IV fluids until 

patient's oral intake improves.





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 

exchanged on 4/20.  Now he has evidence for infection as discussed above.  He 

did have hematuria that led to obstruction of his Cook catheter requiring a 

change.


-Continue home medications





Maintenance issues - 


-DVT prophylaxis-mechanical with recent hematuria


-GI prophylaxis-not indicated


-Nutrition-regular


-Cook catheter-chronic indwelling





CODE STATUS -DNR/DNI

## 2021-04-23 RX ADMIN — Medication SCH CAP: at 20:18

## 2021-04-23 RX ADMIN — Medication SCH CAP: at 08:59

## 2021-04-23 RX ADMIN — TIMOLOL MALEATE SCH DROP: 5 SOLUTION OPHTHALMIC at 09:00

## 2021-04-23 NOTE — PCM.PN
- General Info


Date of Service: 04/23/21


Admission Dx/Problem (Free Text): 


                           Admission Diagnosis/Problem





Admission Diagnosis/Problem      Bilateral pneumonia








Subjective Update: 


Urine output remained marginal over much of the day yesterday and overnight.  It

is slightly picking up this morning.  Patient remains on antibiotics in 

treatment of bilateral pneumonia and possible complicated urinary tract 

infection related to indwelling Cook catheter.  She reports subjectively 

feeling slightly better today than previous.





Functional Status: Reports: Pain Controlled





- Review of Systems


General: Reports: No Symptoms


HEENT: Reports: No Symptoms


Pulmonary: Reports: No Symptoms


Cardiovascular: Reports: No Symptoms


Gastrointestinal: Reports: No Symptoms


Genitourinary: Denies: Burning, Flank Pain


Musculoskeletal: Reports: No Symptoms


Neurological: Reports: No Symptoms


Psychiatric: Reports: No Symptoms





- Patient Data


Vitals - Most Recent: 


                                Last Vital Signs











Temp  97.2 F   04/23/21 11:00


 


Pulse  66   04/23/21 11:00


 


Resp  18   04/23/21 11:00


 


BP  109/58 L  04/23/21 11:00


 


Pulse Ox  97   04/23/21 11:00











Weight - Most Recent: 139 lb 9.582 oz


I&O - Last 24 Hours: 


                                 Intake & Output











 04/22/21 04/23/21 04/23/21





 22:59 06:59 14:59


 


Intake Total 2576 1102 


 


Output Total 155 235 180


 


Balance 2421 867 -180











Lab Results Last 24 Hours: 


                         Laboratory Results - last 24 hr











  04/22/21 04/22/21 Range/Units





  14:56 14:56 


 


WBC   5.8  (4.5-11.0)  K/uL


 


RBC   2.78 L  (4.30-5.90)  M/uL


 


Hgb   8.8 L  (12.0-15.0)  g/dL


 


Hct   28.1 L  (40.0-54.0)  %


 


MCV   101 H  (80-98)  fL


 


MCH   32 H  (27-31)  pg


 


MCHC   31 L  (32-36)  %


 


Plt Count   113 L  (150-400)  K/uL


 


Sodium  141   (140-148)  mmol/L


 


Potassium  4.3   (3.6-5.2)  mmol/L


 


Chloride  109 H   (100-108)  mmol/L


 


Carbon Dioxide  21   (21-32)  mmol/L


 


Anion Gap  15.3 H   (5.0-14.0)  mmol/L


 


BUN  39 H   (7-18)  mg/dL


 


Creatinine  1.5 H   (0.8-1.3)  mg/dL


 


Est Cr Clr Drug Dosing  32.25   mL/min


 


Estimated GFR (MDRD)  44 L   (>60)  


 


Glucose  109 H   ()  mg/dL


 


Calcium  8.5   (8.5-10.1)  mg/dL











[facility] Results Last 24 Hours: 


                                  Microbiology











 04/21/21 15:35 Urine Culture - Preliminary





 Urine, Cook Cath (Indwelling)    MIXED POSITIVE [place]X DAY 1


 


 04/21/21 15:26 Aerobic Blood Culture - Preliminary





 Blood - Venous    NO GROWTH AFTER 1 DAY





 Anaerobic Blood Culture - Preliminary





    NO GROWTH AFTER 1 DAY


 


 04/21/21 14:19 Aerobic Blood Culture - Preliminary





 Blood - Venous    NO GROWTH AFTER 1 DAY





 Anaerobic Blood Culture - Preliminary





    NO GROWTH AFTER 1 DAY











Med Orders - Current: 


                               Current Medications





Acetaminophen (Acetaminophen 325 Mg Tab)  650 mg PO Q4H PRN


   PRN Reason: Pain (Mild 1-3)/fever


   Last Admin: 04/22/21 07:30 Dose:  650 mg


   Documented by: 


Hydrocodone Bitart/Acetaminophen (Acetaminophen/Hydrocodone 325-5 Mg Tab)  1 tab

PO Q4H PRN


   PRN Reason: Pain (moderate 4-6)


Albuterol (Albuterol 0.083% 2.5 Mg/3 Ml Neb Soln)  2.5 mg NEB Q4H PRN


   PRN Reason: Shortness Of Breath/wheezing


Aspirin (Aspirin 81 Mg Tab.Chew)  81 mg PO DAILY [facility]


   Last Admin: 04/23/21 08:59 Dose:  81 mg


   Documented by: 


Atorvastatin Calcium (Atorvastatin 20 Mg Tab)  80 mg PO BEDTIME [facility]


   Last Admin: 04/22/21 20:18 Dose:  80 mg


   Documented by: 


Capsaicin (Capsaicin 0.025% Crm 60 Gm Tube)  0 gm TOP Q2H PRN


   PRN Reason: joint pain


Carvedilol (Carvedilol 3.125 Mg Tab)  6.25 mg PO BID [facility]


   Last Admin: 04/23/21 08:59 Dose:  6.25 mg


   Documented by: 


Clopidogrel Bisulfate (Clopidogrel 75 Mg Tab)  75 mg PO DAILY [facility]


   Last Admin: 04/23/21 08:59 Dose:  75 mg


   Documented by: 


Furosemide (Furosemide 20 Mg Tab)  20 mg PO DAILY [facility]


Levofloxacin/Dextrose 750 mg/ (Premix)  150 mls @ 100 mls/hr IV Q48H [facility]


Sodium Chloride (Normal Saline)  1,000 mls @ 100 mls/hr IV ASDIRECTED [facility]


   Last Admin: 04/23/21 11:31 Dose:  125 mls/hr


   Documented by: 


Isosorbide Mononitrate (Isosorbide Mononitrate 30 Mg Tab.Er)  30 mg PO DAILY [facility]


   Last Admin: 04/23/21 08:59 Dose:  30 mg


   Documented by: 


Lactobacillus Rhamnosus (Lactobacillus Rhamnosus Gg (Probiotic) Cap)  1 cap PO 

BID [facility]


   Last Admin: 04/23/21 08:59 Dose:  1 cap


   Documented by: 


Latanoprost (Latanoprost 0.005% Ophth Soln 2.5 Ml Bottle)  0 ml EYEBOTH BEDTIME 

[facility]


   Last Admin: 04/22/21 20:17 Dose:  1 drop


   Documented by: 


Lorazepam (Lorazepam 2 Mg/Ml Sdv)  0.5 mg IVPUSH Q4H PRN


   PRN Reason: Nausea/Vomiting


Magnesium Hydroxide (Magnesium Hydroxide 400 Mg/5 Ml Susp 30 Ml Cup)  30 ml PO 

Q12H PRN


   PRN Reason: Constipation


Melatonin (Melatonin 3 Mg Tab)  9 mg PO BEDTIME [facility]


   Last Admin: 04/22/21 20:17 Dose:  9 mg


   Documented by: 


Mirtazapine (Mirtazapine 15 Mg Tab)  15 mg PO BEDTIME [facility]


   Last Admin: 04/22/21 20:17 Dose:  15 mg


   Documented by: 


Ondansetron HCl (Ondansetron 4 Mg/2 Ml Sdv)  4 mg IV Q6H PRN


   PRN Reason: Nausea/Vomiting


Ondansetron HCl (Ondansetron 4 Mg Tab.Dis)  4 mg PO Q6H PRN


   PRN Reason: Nausea able to take PO


Pantoprazole Sodium (Pantoprazole 40 Mg Tab.Cr)  40 mg PO ACBREAKFAST [facility]


   Last Admin: 04/23/21 08:59 Dose:  40 mg


   Documented by: 


Senna/Docusate Sodium (Docusate Sodium/Sennosides 50-8.6 Mg Tab)  2 tab PO DAILY

[facility]


   Last Admin: 04/23/21 08:58 Dose:  2 tab


   Documented by: 


Senna/Docusate Sodium (Docusate Sodium/Sennosides 50-8.6 Mg Tab)  1 tab PO BID 

PRN


   PRN Reason: Constipation


Sodium Chloride (Sodium Chloride 0.9% 10 Ml Syringe)  10 ml FLUSH ASDIRECTED PRN


   PRN Reason: Keep Vein Open


   Last Admin: 04/21/21 14:33 Dose:  10 ml


   Documented by: 


Tamsulosin HCl (Tamsulosin 0.4 Mg Cap.Er)  0.4 mg PO DAILY [facility]


   Last Admin: 04/23/21 08:59 Dose:  0.4 mg


   Documented by: 


Timolol Maleate (Timolol Maleate 0.5% Ophth Soln 5 Ml Bottle)  0 ml EYEBOTH 

DAILY [facility]


   Last Admin: 04/23/21 09:00 Dose:  1 drop


   Documented by: 





Discontinued Medications





Acetaminophen (Acetaminophen 325 Mg Tab)  650 mg PO NOW ONE


   Stop: 04/21/21 14:08


   Last Admin: 04/21/21 14:29 Dose:  650 mg


   Documented by: 


Carvedilol (Carvedilol 12.5 Mg Tab)  12.5 mg PO BIDAC [facility]


   Last Admin: 04/22/21 10:17 Dose:  12.5 mg


   Documented by: 


Lactated Ringer's (Ringers, Lactated)  1,000 mls @ 1,000 mls/hr IV BOLUS ONE


   Stop: 04/21/21 15:07


   Last Admin: 04/21/21 14:15 Dose:  1,000 mls/hr


   Documented by: 


Levofloxacin/Dextrose 750 mg/ (Premix)  150 mls @ 100 mls/hr IV ONETIME ONE


   Stop: 04/21/21 16:56


   Last Admin: 04/21/21 15:40 Dose:  100 mls/hr


   Documented by: 


Lactated Ringer's (Ringers, Lactated)  1,000 mls @ 150 mls/hr IV ASDIRECTED [facility]


   Last Admin: 04/21/21 15:38 Dose:  150 mls/hr


   Documented by: 


Sodium Chloride (Normal Saline)  1,000 mls @ 100 mls/hr IV ASDIRECTED [facility]


   Last Admin: 04/22/21 09:18 Dose:  100 mls/hr


   Documented by: 


Sodium Chloride (Normal Saline)  500 mls @ 250 mls/hr IV .BOLUS ONE


   Stop: 04/22/21 05:53


   Last Admin: 04/22/21 04:01 Dose:  250 mls/hr


   Documented by: 


Sodium Chloride (Normal Saline)  1,000 mls @ 75 mls/hr IV ASDIRECTED [facility]


   Last Admin: 04/22/21 16:22 Dose:  75 mls/hr


   Documented by: 


Sodium Chloride (Normal Saline)  1,000 mls @ 999 mls/hr IV ONETIME ONE


   Stop: 04/22/21 16:00


   Last Admin: 04/22/21 15:10 Dose:  999 mls/hr


   Documented by: 


Sodium Chloride (Normal Saline)  500 mls @ 250 mls/hr IV .BOLUS ONE


   Stop: 04/22/21 20:30


   Last Admin: 04/22/21 18:35 Dose:  250 mls/hr


   Documented by: 


Potassium Chloride (Potassium Chloride 20 Meq Tab.Er)  40 meq PO ONETIME ONE


   Stop: 04/21/21 17:31


   Last Admin: 04/21/21 17:22 Dose:  40 meq


   Documented by: 


Timolol Maleate (Timolol Maleate 0.5% Ophth Soln 5 Ml Bottle)  0 ml EYEBOTH 

BEDTIME [facility]


   Last Admin: 04/21/21 21:35 Dose:  Not Given


   Documented by: 











- Exam


Quality Assessment: Supplemental Oxygen


General: Alert, Oriented


HEENT: No: Scleral Icterus


Neck: Supple, Trachea Midline, No JVD


Lungs: Decreased Breath Sounds, Crackles


Cardiovascular: Regular Rate, Regular Rhythm


GI/Abdominal Exam: Soft, Non-Tender


Extremities: Normal Inspection


Neurological: No New Focal Deficit


Psy/Mental Status: Alert, Normal Affect, Normal Mood





- Patient Data


Lab Results Last 24 hrs: 


                         Laboratory Results - last 24 hr











  04/22/21 04/22/21 Range/Units





  14:56 14:56 


 


WBC   5.8  (4.5-11.0)  K/uL


 


RBC   2.78 L  (4.30-5.90)  M/uL


 


Hgb   8.8 L  (12.0-15.0)  g/dL


 


Hct   28.1 L  (40.0-54.0)  %


 


MCV   101 H  (80-98)  fL


 


MCH   32 H  (27-31)  pg


 


MCHC   31 L  (32-36)  %


 


Plt Count   113 L  (150-400)  K/uL


 


Sodium  141   (140-148)  mmol/L


 


Potassium  4.3   (3.6-5.2)  mmol/L


 


Chloride  109 H   (100-108)  mmol/L


 


Carbon Dioxide  21   (21-32)  mmol/L


 


Anion Gap  15.3 H   (5.0-14.0)  mmol/L


 


BUN  39 H   (7-18)  mg/dL


 


Creatinine  1.5 H   (0.8-1.3)  mg/dL


 


Est Cr Clr Drug Dosing  32.25   mL/min


 


Estimated GFR (MDRD)  44 L   (>60)  


 


Glucose  109 H   ()  mg/dL


 


Calcium  8.5   (8.5-10.1)  mg/dL











Result Diagrams: 


                                 04/22/21 14:56





                                 04/22/21 14:56


[facility] Results Last 24 hrs: 


                                  Microbiology











 04/21/21 15:35 Urine Culture - Preliminary





 Urine, Cook Cath (Indwelling)    MIXED POSITIVE [place] DAY 1


 


 04/21/21 15:26 Aerobic Blood Culture - Preliminary





 Blood - Venous    NO GROWTH AFTER 1 DAY





 Anaerobic Blood Culture - Preliminary





    NO GROWTH AFTER 1 DAY


 


 04/21/21 14:19 Aerobic Blood Culture - Preliminary





 Blood - Venous    NO GROWTH AFTER 1 DAY





 Anaerobic Blood Culture - Preliminary





    NO GROWTH AFTER 1 DAY














Sepsis Event Note





- Evaluation


Sepsis Screening Result: No Definite Risk





- Focused Exam


Vital Signs: 


                                   Vital Signs











  Temp Pulse Pulse Resp BP BP Pulse Ox


 


 04/23/21 11:00  97.2 F   66  18   109/58 L  97


 


 04/23/21 08:59   84    140/73  


 


 04/23/21 07:00  98.6 F   84  18   140/73  96


 


 04/23/21 03:19  100.4 F   84  20   117/66  95














- Problem List Review


Problem List Initiated/Reviewed/Updated: Yes





- My Orders


Last 24 Hours: 


My Active Orders





04/22/21 18:15


Bladder Scan [RC] ONETIME 





04/22/21 21:00


carvediloL [Coreg]   6.25 mg PO BID 





04/23/21 12:30


Furosemide [Lasix]   20 mg PO DAILY 





04/24/21 06:00


BASIC METABOLIC PANEL,BMP [CHEM] Routine 


CBC W/O DIFF,HEMOGRAM [HEME] Routine 














- Plan


Plan:: 





ASSESSMENT AND PLAN - 





BILATERAL PNEUMONIA


respiratory symptoms appear to have improved. Continued oxygen requirement.


-Continue levofloxacin every 48 hours


-Supplement oxygen as needed


-Follow-up cultures





PYURIA


BACTERURIA


Concern for complicated UTI.  Urine culture is pending


-Levofloxacin as above


-Follow-up urine culture





ACUTE KIDNEY INJURY ON TOP OF CHRONIC KIDNEY DISEASE STAGE III


Renal function mildly elevated from baseline.  Urine output has been low though 

it is improving.  We will plan on continued maintenance IV fluids until patient 

is tolerating adequate oral intake.  I also requested to resume patient's low-

dose Lasix today which should aid with filtration, urine output.





CHRONIC SYSTOLIC CONGESTIVE HEART FAILURE


Patient reports some coughing but denies significant shortness of breath.  He 

does continue to have an oxygen requirement.


Continue maintenance IV fluids for now.  Closely monitor I's/O's and 

discontinue fluids when patient is tolerating adequate oral intake.





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 

exchanged on 4/20. He did have hematuria that led to obstruction of his Cook 

catheter requiring a change.  I requested a bladder scan last evening which 

revealed less than 100 cc of urine in the bladder.  Cook appears to be 

functioning properly.


-Continue home medications and closely monitor urine output





Maintenance issues - 


-DVT prophylaxis-mechanical with recent hematuria


-GI prophylaxis-not indicated


-Nutrition-regular


-Cook catheter-chronic indwelling





CODE STATUS -DNR/DNI

## 2021-04-24 RX ADMIN — GUAIFENESIN AND DEXTROMETHORPHAN PRN ML: 100; 10 SYRUP ORAL at 21:50

## 2021-04-24 RX ADMIN — Medication SCH CAP: at 21:26

## 2021-04-24 RX ADMIN — Medication SCH CAP: at 09:02

## 2021-04-24 RX ADMIN — TIMOLOL MALEATE SCH DROP: 5 SOLUTION OPHTHALMIC at 09:02

## 2021-04-24 NOTE — PCM.PN
- General Info


Date of Service: 04/24/21


Admission Dx/Problem (Free Text): 


                           Admission Diagnosis/Problem





Admission Diagnosis/Problem      Bilateral pneumonia








Subjective Update: 


Urine output picked up with IV fluid administration.  The patient awoke a and 

was wheezing.  His dyspnea resolved with a DuoNeb and a dose of IV Lasix.  Urine

culture is growing out already mixed violeta and blood cultures from admission 

have been negative.  The patient reports steady improvement in symptoms from 

admission.





Functional Status: Reports: Pain Controlled, Tolerating Diet





- Review of Systems


General: Denies: Fever, Night Sweats


HEENT: Reports: No Symptoms


Pulmonary: Reports: Shortness of Breath, Wheezing


Cardiovascular: Denies: Chest Pain, Palpitations


Gastrointestinal: Reports: No Symptoms


Genitourinary: Reports: No Symptoms


Musculoskeletal: Reports: No Symptoms


Skin: Reports: No Symptoms


Neurological: Reports: No Symptoms


Psychiatric: Reports: No Symptoms





- Patient Data


Vitals - Most Recent: 


                                Last Vital Signs











Temp  96.9 F   04/24/21 10:46


 


Pulse  81   04/24/21 10:46


 


Resp  18   04/24/21 10:46


 


BP  138/87   04/24/21 10:46


 


Pulse Ox  94 L  04/24/21 10:46











Weight - Most Recent: 139 lb 9.582 oz


I&O - Last 24 Hours: 


                                 Intake & Output











 04/23/21 04/24/21 04/24/21





 22:59 06:59 14:59


 


Intake Total 1451 1115 320


 


Output Total 225 1400 350


 


Balance 1226 -285 -30











Lab Results Last 24 Hours: 


                         Laboratory Results - last 24 hr











  04/24/21 04/24/21 Range/Units





  05:45 05:45 


 


WBC  5.7   (4.5-11.0)  K/uL


 


RBC  3.21 L   (4.30-5.90)  M/uL


 


Hgb  10.0 L   (12.0-15.0)  g/dL


 


Hct  32.0 L   (40.0-54.0)  %


 


MCV  100 H   (80-98)  fL


 


MCH  31   (27-31)  pg


 


MCHC  31 L   (32-36)  %


 


Plt Count  130 L   (150-400)  K/uL


 


Sodium   146  (140-148)  mmol/L


 


Potassium   4.2  (3.6-5.2)  mmol/L


 


Chloride   113 H  (100-108)  mmol/L


 


Carbon Dioxide   20 L  (21-32)  mmol/L


 


Anion Gap   17.2 H  (5.0-14.0)  mmol/L


 


BUN   27 H  (7-18)  mg/dL


 


Creatinine   1.3  (0.8-1.3)  mg/dL


 


Est Cr Clr Drug Dosing   37.21  mL/min


 


Estimated GFR (MDRD)   52 L  (>60)  


 


Glucose   115 H  ()  mg/dL


 


Calcium   8.3 L  (8.5-10.1)  mg/dL











[facility] Results Last 24 Hours: 


                                  Microbiology











 04/21/21 15:35 Urine Culture - Final





 Urine, Cook Cath (Indwelling)    MIXED POSITIVE VIOLETA DAY 2


 


 04/21/21 15:26 Aerobic Blood Culture - Preliminary





 Blood - Venous    NO GROWTH AFTER 2 DAYS





 Anaerobic Blood Culture - Preliminary





    NO GROWTH AFTER 2 DAYS


 


 04/21/21 14:19 Aerobic Blood Culture - Preliminary





 Blood - Venous    NO GROWTH AFTER 2 DAYS





 Anaerobic Blood Culture - Preliminary





    NO GROWTH AFTER 2 DAYS











Med Orders - Current: 


                               Current Medications





Acetaminophen (Acetaminophen 325 Mg Tab)  650 mg PO Q4H PRN


   PRN Reason: Pain (Mild 1-3)/fever


   Last Admin: 04/22/21 07:30 Dose:  650 mg


   Documented by: 


Hydrocodone Bitart/Acetaminophen (Acetaminophen/Hydrocodone 325-5 Mg Tab)  1 tab

PO Q4H PRN


   PRN Reason: Pain (moderate 4-6)


Albuterol (Albuterol 0.083% 2.5 Mg/3 Ml Neb Soln)  2.5 mg NEB Q4H PRN


   PRN Reason: Shortness Of Breath/wheezing


   Last Admin: 04/24/21 05:35 Dose:  2.5 mg


   Documented by: 


Aspirin (Aspirin 81 Mg Tab.Chew)  81 mg PO DAILY [facility]


   Last Admin: 04/24/21 09:01 Dose:  81 mg


   Documented by: 


Atorvastatin Calcium (Atorvastatin 20 Mg Tab)  80 mg PO BEDTIME [facility]


   Last Admin: 04/23/21 20:15 Dose:  80 mg


   Documented by: 


Capsaicin (Capsaicin 0.025% Crm 60 Gm Tube)  0 gm TOP Q2H PRN


   PRN Reason: joint pain


Carvedilol (Carvedilol 3.125 Mg Tab)  12.5 mg PO BID [facility]


Carvedilol (Carvedilol 3.125 Mg Tab)  6.25 mg PO ONETIME ONE


   Stop: 04/24/21 10:53


Clopidogrel Bisulfate (Clopidogrel 75 Mg Tab)  75 mg PO DAILY [facility]


   Last Admin: 04/24/21 09:00 Dose:  75 mg


   Documented by: 


Furosemide (Furosemide 20 Mg Tab)  20 mg PO DAILY [facility]


   Last Admin: 04/24/21 09:00 Dose:  20 mg


   Documented by: 


Hydralazine HCl (Hydralazine 20 Mg/Ml Sdv)  10 mg IVPUSH Q2H PRN


   PRN Reason: Hypertension


Levofloxacin/Dextrose 750 mg/ (Premix)  150 mls @ 100 mls/hr IV Q48H [facility]


   Last Admin: 04/23/21 16:58 Dose:  100 mls/hr


   Documented by: 


Isosorbide Mononitrate (Isosorbide Mononitrate 30 Mg Tab.Er)  30 mg PO DAILY [facility]


   Last Admin: 04/24/21 09:01 Dose:  30 mg


   Documented by: 


Lactobacillus Rhamnosus (Lactobacillus Rhamnosus Gg (Probiotic) Cap)  1 cap PO 

BID [facility]


   Last Admin: 04/24/21 09:02 Dose:  1 cap


   Documented by: 


Latanoprost (Latanoprost 0.005% Ophth Soln 2.5 Ml Bottle)  0 ml EYEBOTH BEDTIME 

[facility]


   Last Admin: 04/23/21 20:18 Dose:  1 drop


   Documented by: 


Lorazepam (Lorazepam 2 Mg/Ml Sdv)  0.5 mg IVPUSH Q4H PRN


   PRN Reason: Nausea/Vomiting


Magnesium Hydroxide (Magnesium Hydroxide 400 Mg/5 Ml Susp 30 Ml Cup)  30 ml PO 

Q12H PRN


   PRN Reason: Constipation


Melatonin (Melatonin 3 Mg Tab)  9 mg PO BEDTIME [facility]


   Last Admin: 04/23/21 20:15 Dose:  9 mg


   Documented by: 


Mirtazapine (Mirtazapine 15 Mg Tab)  15 mg PO BEDTIME [facility]


   Last Admin: 04/23/21 20:18 Dose:  15 mg


   Documented by: 


Ondansetron HCl (Ondansetron 4 Mg/2 Ml Sdv)  4 mg IV Q6H PRN


   PRN Reason: Nausea/Vomiting


Ondansetron HCl (Ondansetron 4 Mg Tab.Dis)  4 mg PO Q6H PRN


   PRN Reason: Nausea able to take PO


Pantoprazole Sodium (Pantoprazole 40 Mg Tab.Cr)  40 mg PO ACBREAKFAST [facility]


   Last Admin: 04/24/21 09:00 Dose:  40 mg


   Documented by: 


Senna/Docusate Sodium (Docusate Sodium/Sennosides 50-8.6 Mg Tab)  2 tab PO DAILY

[facility]


   Last Admin: 04/24/21 09:00 Dose:  2 tab


   Documented by: 


Senna/Docusate Sodium (Docusate Sodium/Sennosides 50-8.6 Mg Tab)  1 tab PO BID 

PRN


   PRN Reason: Constipation


Sodium Chloride (Sodium Chloride 0.9% 10 Ml Syringe)  10 ml FLUSH ASDIRECTED PRN


   PRN Reason: Keep Vein Open


   Last Admin: 04/21/21 14:33 Dose:  10 ml


   Documented by: 


Tamsulosin HCl (Tamsulosin 0.4 Mg Cap.Er)  0.4 mg PO DAILY [facility]


   Last Admin: 04/24/21 09:00 Dose:  0.4 mg


   Documented by: 


Timolol Maleate (Timolol Maleate 0.5% Ophth Soln 5 Ml Bottle)  0 ml EYEBOTH 

DAILY [facility]


   Last Admin: 04/24/21 09:02 Dose:  1 drop


   Documented by: 





Discontinued Medications





Acetaminophen (Acetaminophen 325 Mg Tab)  650 mg PO NOW ONE


   Stop: 04/21/21 14:08


   Last Admin: 04/21/21 14:29 Dose:  650 mg


   Documented by: 


Carvedilol (Carvedilol 12.5 Mg Tab)  12.5 mg PO BIDAC [facility]


   Last Admin: 04/22/21 10:17 Dose:  12.5 mg


   Documented by: 


Carvedilol (Carvedilol 3.125 Mg Tab)  6.25 mg PO BID [facility]


   Last Admin: 04/24/21 09:01 Dose:  6.25 mg


   Documented by: 


Furosemide (Furosemide 20 Mg/2 Ml Vial)  20 mg IVPUSH ONETIME ONE


   Stop: 04/24/21 05:44


   Last Admin: 04/24/21 05:45 Dose:  20 mg


   Documented by: 


Lactated Ringer's (Ringers, Lactated)  1,000 mls @ 1,000 mls/hr IV BOLUS ONE


   Stop: 04/21/21 15:07


   Last Admin: 04/21/21 14:15 Dose:  1,000 mls/hr


   Documented by: 


Levofloxacin/Dextrose 750 mg/ (Premix)  150 mls @ 100 mls/hr IV ONETIME ONE


   Stop: 04/21/21 16:56


   Last Admin: 04/21/21 15:40 Dose:  100 mls/hr


   Documented by: 


Lactated Ringer's (Ringers, Lactated)  1,000 mls @ 150 mls/hr IV ASDIRECTED [facility]


   Last Admin: 04/21/21 15:38 Dose:  150 mls/hr


   Documented by: 


Sodium Chloride (Normal Saline)  1,000 mls @ 100 mls/hr IV ASDIRECTED [facility]


   Last Admin: 04/22/21 09:18 Dose:  100 mls/hr


   Documented by: 


Sodium Chloride (Normal Saline)  500 mls @ 250 mls/hr IV .BOLUS ONE


   Stop: 04/22/21 05:53


   Last Admin: 04/22/21 04:01 Dose:  250 mls/hr


   Documented by: 


Sodium Chloride (Normal Saline)  1,000 mls @ 75 mls/hr IV ASDIRECTED [facility]


   Last Admin: 04/22/21 16:22 Dose:  75 mls/hr


   Documented by: 


Sodium Chloride (Normal Saline)  1,000 mls @ 999 mls/hr IV ONETIME ONE


   Stop: 04/22/21 16:00


   Last Admin: 04/22/21 15:10 Dose:  999 mls/hr


   Documented by: 


Sodium Chloride (Normal Saline)  500 mls @ 250 mls/hr IV .BOLUS ONE


   Stop: 04/22/21 20:30


   Last Admin: 04/22/21 18:35 Dose:  250 mls/hr


   Documented by: 


Sodium Chloride (Normal Saline)  1,000 mls @ 100 mls/hr IV ASDIRECTED [facility]


   Last Admin: 04/23/21 22:38 Dose:  125 mls/hr


   Documented by: 


Sodium Chloride (Normal Saline)  1,000 mls @ 50 mls/hr IV ASDIRECTED [facility]


Potassium Chloride (Potassium Chloride 20 Meq Tab.Er)  40 meq PO ONETIME ONE


   Stop: 04/21/21 17:31


   Last Admin: 04/21/21 17:22 Dose:  40 meq


   Documented by: 


Timolol Maleate (Timolol Maleate 0.5% Ophth Soln 5 Ml Bottle)  0 ml EYEBOTH 

BEDTIME [facility]


   Last Admin: 04/21/21 21:35 Dose:  Not Given


   Documented by: 











- Exam


Quality Assessment: Supplemental Oxygen, Urine Catheter


General: Alert, Oriented, Cooperative


HEENT: No: Scleral Icterus


Neck: Supple, Trachea Midline


Lungs: Decreased Breath Sounds


Cardiovascular: Regular Rate, Regular Rhythm


GI/Abdominal Exam: Soft, Non-Tender


Extremities: Normal Inspection


Neurological: No New Focal Deficit


Psy/Mental Status: Alert, Normal Affect, Normal Mood





- Patient Data


Lab Results Last 24 hrs: 


                         Laboratory Results - last 24 hr











  04/24/21 04/24/21 Range/Units





  05:45 05:45 


 


WBC  5.7   (4.5-11.0)  K/uL


 


RBC  3.21 L   (4.30-5.90)  M/uL


 


Hgb  10.0 L   (12.0-15.0)  g/dL


 


Hct  32.0 L   (40.0-54.0)  %


 


MCV  100 H   (80-98)  fL


 


MCH  31   (27-31)  pg


 


MCHC  31 L   (32-36)  %


 


Plt Count  130 L   (150-400)  K/uL


 


Sodium   146  (140-148)  mmol/L


 


Potassium   4.2  (3.6-5.2)  mmol/L


 


Chloride   113 H  (100-108)  mmol/L


 


Carbon Dioxide   20 L  (21-32)  mmol/L


 


Anion Gap   17.2 H  (5.0-14.0)  mmol/L


 


BUN   27 H  (7-18)  mg/dL


 


Creatinine   1.3  (0.8-1.3)  mg/dL


 


Est Cr Clr Drug Dosing   37.21  mL/min


 


Estimated GFR (MDRD)   52 L  (>60)  


 


Glucose   115 H  ()  mg/dL


 


Calcium   8.3 L  (8.5-10.1)  mg/dL











Result Diagrams: 


                                 04/24/21 05:45





                                 04/24/21 05:45


[facility] Results Last 24 hrs: 


                                  Microbiology











 04/21/21 15:35 Urine Culture - Final





 Urine, Cook Cath (Indwelling)    MIXED POSITIVE VIOLETA DAY 2


 


 04/21/21 15:26 Aerobic Blood Culture - Preliminary





 Blood - Venous    NO GROWTH AFTER 2 DAYS





 Anaerobic Blood Culture - Preliminary





    NO GROWTH AFTER 2 DAYS


 


 04/21/21 14:19 Aerobic Blood Culture - Preliminary





 Blood - Venous    NO GROWTH AFTER 2 DAYS





 Anaerobic Blood Culture - Preliminary





    NO GROWTH AFTER 2 DAYS














Sepsis Event Note





- Evaluation


Sepsis Screening Result: No Definite Risk





- Focused Exam


Vital Signs: 


                                   Vital Signs











  Temp Pulse Pulse Resp BP BP BP


 


 04/24/21 10:46  96.9 F   81  18   138/87 


 


 04/24/21 09:01   99    160/99 H  


 


 04/24/21 07:00  97.3 F   99  20   160/99 H 


 


 04/24/21 06:18        180/100 H


 


 04/24/21 05:42  98.1 F   112 H  25 H   206/120 H 


 


 04/24/21 05:30    120 H  24 H   201/112 H 


 


 04/23/21 23:07  97.8 F   80  16   140/83 














  Pulse Ox


 


 04/24/21 10:46  94 L


 


 04/24/21 09:01 


 


 04/24/21 07:00  92 L


 


 04/24/21 06:18 


 


 04/24/21 05:42  94 L


 


 04/24/21 05:30  88 L


 


 04/23/21 23:07  93 L














- Problem List Review


Problem List Initiated/Reviewed/Updated: Yes





- My Orders


Last 24 Hours: 


My Active Orders





04/23/21 12:30


Furosemide [Lasix]   20 mg PO DAILY 





04/24/21 10:52


carvediloL [Coreg]   6.25 mg PO ONETIME ONE 





04/24/21 10:53


hydrALAZINE [Apresoline]   10 mg IVPUSH Q2H PRN 





04/24/21 21:00


carvediloL [Coreg]   12.5 mg PO BID 














- Plan


Plan:: 





ASSESSMENT AND PLAN - 





BILATERAL PNEUMONIA


Respiratory distress this morning likely from some mild fluid overload.  

Respiratory status back to near baseline.


-Continue levofloxacin every 48 hours


-Supplement oxygen as needed





ACUTE KIDNEY INJURY ON TOP OF CHRONIC KIDNEY DISEASE STAGE III


Renal function back to presumptive baseline.  Patient is tolerating oral intake 

and IV fluids have been discontinued.  He has been resumed on his usual dosing 

of Lasix.





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


Secondary to IV fluids received in treatment of infection.  Patient received an 

additional dose of Lasix today.  IV fluids have been discontinued.  Continue to 

wean oxygen as able to maintain appropriate saturations.





HYPERTENSION


Patient's Coreg was decreased initially due to borderline blood pressures.  With

 patient's episode of significant dyspnea this morning, his blood pressure was 

quite elevated.  His Coreg was increased back to 12.5 mg twice daily with an 

additional dose of 6.25 mg this morning.  I also requested hydralazine if needed

 for significant blood pressure elevation.





BPH WITH URINARY OBSTRUCTION


PYURIA


BACTERURIA


No evidence of UTI on culture. Cook catheter last exchanged on 4/20. He did 

have hematuria that led to obstruction of his Cook catheter requiring a change.


-Continue home medications and closely monitor urine output





Maintenance issues - 


-DVT prophylaxis-mechanical with recent hematuria


-Nutrition-regular


-Cook catheter-chronic indwelling





CODE STATUS -DNR/DNI





DISPOSITION


Continue inpatient monitoring and care today.  Patient has been moving 

relatively well.  Consider discharge patient back to home in the next 1 to 2 

days.

## 2021-04-25 RX ADMIN — TIMOLOL MALEATE SCH DROP: 5 SOLUTION OPHTHALMIC at 09:41

## 2021-04-25 RX ADMIN — Medication SCH CAP: at 09:41

## 2021-04-25 RX ADMIN — GUAIFENESIN AND DEXTROMETHORPHAN PRN ML: 100; 10 SYRUP ORAL at 02:45

## 2021-04-25 NOTE — [id number]
**Discharge Summary





- Hospital Course


Free Text/Narrative:: 


85-year-old male admitted on 4/21 with symptoms secondary to bilateral 

pneumonia.








- Discharge Data


Discharge Date: 04/25/21


Discharge Disposition: Home, Self-Care 01


Condition: Fair





- Referral to Home Health


Primary Care Physician: 


PCP None








- Patient Summary/Data


Consults: 


                                  Consultations





04/21/21 17:45


Consult to Spiritual Care [CONS] Routine 


   Special Instructions: pt requests





04/22/21 07:00


PT Evaluation and Treatment [CONS] Routine 


   Please Evaluate and Treat.


   PT Reason for Consult: Strengthening


   This query below is only for informational purposes and is not editable.











Hospital Course: 


Patient was initiated with treatment with Levaquin for bilateral pneumonia and 

concern for possible complicated urinary tract infection.  The patient's blood 

and urine cultures from admission had no significant growth.  His presenting 

symptoms were deemed secondary to pneumonia.  He was treated with renally dosed 

Levaquin over the course of hospitalization and with improvement of his renal 

function to baseline, he will be continued on daily Levaquin at discharge for an

 additional several days to complete his antibiotic course.  The patient was 

appropriate for discharge home on 4/25.








- Patient Instructions


Diet: Regular Diet as Tolerated


Activity: As Tolerated


Showering/Bathing: May Shower


Notify Provider of: Fever, Increased Pain





- Discharge Plan


*PRESCRIPTION DRUG MONITORING PROGRAM REVIEWED*: Not Applicable


*COPY OF PRESCRIPTION DRUG MONITORING REPORT IN PATIENT HODA: Not Applicable


Prescriptions/Med Rec: 


Levofloxacin 750 mg PO DAILY #3 tablet


Home Medications: 


                                    Home Meds





Aspirin 81 mg PO DAILY 01/15/21 [History]


Bimatoprost [Lumigan 0.01% Ophth Soln] 1 drop EYEBOTH DAILY 01/15/21 [History]


Capsaicin [Zostrix 0.025% Crm] 1 dose TOP ASDIRECTED 01/15/21 [History]


Furosemide [Lasix] 20 mg PO DAILY 01/15/21 [History]


Hydrocodone/Acetaminophen [Hydrocodon-Acetaminophen 5-325] 1 tab PO ASDIRECTED 

PRN 01/15/21 [History]


Nitroglycerin 0.4 mg SL ASDIRECTED 01/15/21 [History]


Omeprazole 40 mg PO DAILY 01/15/21 [History]


Ondansetron [Zofran ODT] 4 mg PO ASDIRECTED PRN 01/15/21 [History]


Sennosides/Docusate Sodium [Senna-Docusate Sodium Tablet] 2 tab PO DAILY 

01/15/21 [History]


Timolol Maleate/PF [Timolol Maleate 0.5% Eye Drop] 1 dose EYEBOTH BEDTIME 

01/15/21 [History]


Clopidogrel [Plavix] 75 mg PO DAILY #30 tablet 01/18/21 [Rx]


Isosorbide Mononitrate [Isosorbide Mononitrate ER] 30 mg PO DAILY #30 01/18/21 [

Rx]


Mirtazapine 15 mg PO BEDTIME #30 01/18/21 [Rx]


Tamsulosin [Flomax] 0.4 mg PO DAILY #30 01/18/21 [Rx]


atorvaSTATin Calcium [Atorvastatin Calcium] 80 mg PO BEDTIME #30 01/18/21 [Rx]


carvediloL [Coreg] 12.5 mg PO BIDAC #60 tablet 01/18/21 [Rx]


Sulfamethoxazole/Trimethoprim [Bactrim Ds Tablet] 1 each PO DAILY 04/21/21 

[History]


Levofloxacin 750 mg PO DAILY #3 tablet 04/25/21 [Rx]








Forms:  ED Department Discharge


Referrals: 


Kyree Lott NP [Ordering Only Provider] - 04/26/21 8:40 am (Please arrive 

15 minutes early to register for your appointment.)





- Discharge Summary/Plan Comment


DC Time >30 min.: Yes (31 minutes)





- Patient Data


Vitals - Most Recent: 


                                Last Vital Signs











Temp  96.9 F   04/25/21 09:54


 


Pulse  108 H  04/25/21 09:40


 


Resp  16   04/25/21 09:54


 


BP  161/95 H  04/25/21 09:54


 


Pulse Ox  93 L  04/25/21 09:54











Weight - Most Recent: 139 lb 9.582 oz


I&O - Last 24 hours: 


                                 Intake & Output











 04/24/21 04/25/21 04/25/21





 22:59 06:59 14:59


 


Intake Total 680  


 


Output Total 1500 600 200


 


Balance -820 -600 -200











Lab Results - Last 24 hrs: 


                         Laboratory Results - last 24 hr











  04/25/21 Range/Units





  05:30 


 


Sodium  147  (140-148)  mmol/L


 


Potassium  3.2 L  (3.6-5.2)  mmol/L


 


Chloride  112 H  (100-108)  mmol/L


 


Carbon Dioxide  25  (21-32)  mmol/L


 


Anion Gap  13.2  (5.0-14.0)  mmol/L


 


BUN  25 H  (7-18)  mg/dL


 


Creatinine  1.1  (0.8-1.3)  mg/dL


 


Est Cr Clr Drug Dosing  43.97  mL/min


 


Estimated GFR (MDRD)  > 60  (>60)  


 


Glucose  95  ()  mg/dL


 


Calcium  8.3 L  (8.5-10.1)  mg/dL











LISA Results - Last 24 hrs: 


                                  Microbiology











 04/21/21 15:26 Aerobic Blood Culture - Preliminary





 Blood - Venous    NO GROWTH AFTER 3 DAYS





 Anaerobic Blood Culture - Preliminary





    NO GROWTH AFTER 3 DAYS


 


 04/21/21 14:19 Aerobic Blood Culture - Preliminary





 Blood - Venous    NO GROWTH AFTER 3 DAYS





 Anaerobic Blood Culture - Preliminary





    NO GROWTH AFTER 3 DAYS











Med Orders - Current: 


                               Current Medications








Discontinued Medications





Acetaminophen (Acetaminophen 325 Mg Tab)  650 mg PO NOW ONE


   Stop: 04/21/21 14:08


   Last Admin: 04/21/21 14:29 Dose:  650 mg


   Documented by: 


Acetaminophen (Acetaminophen 325 Mg Tab)  650 mg PO Q4H PRN


   PRN Reason: Pain (Mild 1-3)/fever


   Last Admin: 04/22/21 07:30 Dose:  650 mg


   Documented by: 


Hydrocodone Bitart/Acetaminophen (Acetaminophen/Hydrocodone 325-5 Mg Tab)  1 tab

PO Q4H PRN


   PRN Reason: Pain (moderate 4-6)


Albuterol (Albuterol 0.083% 2.5 Mg/3 Ml Neb Soln)  2.5 mg NEB Q4H PRN


   PRN Reason: Shortness Of Breath/wheezing


   Last Admin: 04/24/21 05:35 Dose:  2.5 mg


   Documented by: 


Aspirin (Aspirin 81 Mg Tab.Chew)  81 mg PO DAILY [facility]


   Last Admin: 04/25/21 09:40 Dose:  81 mg


   Documented by: 


Atorvastatin Calcium (Atorvastatin 20 Mg Tab)  80 mg PO BEDTIME [facility]


   Last Admin: 04/24/21 21:26 Dose:  80 mg


   Documented by: 


Calcium Carbonate/Glycine (Calcium Carbonate 500 Mg Tab.Chew)  1,000 mg PO Q2H 

PRN


   PRN Reason: Heartburn


   Last Admin: 04/24/21 21:50 Dose:  1,000 mg


   Documented by: 


Capsaicin (Capsaicin 0.025% Crm 60 Gm Tube)  0 gm TOP Q2H PRN


   PRN Reason: joint pain


Carvedilol (Car[facility]ilol 12.5 Mg Tab)  [facility]


   Last Admin: 04/22/21 10:17 Dose:  12.5 mg


   Documented by: 


Carvedilol (Carvedilol 3.125 Mg Tab)  6.25 mg PO BID [facility]


   Last Admin: 04/24/21 09:01 Dose:  6.25 mg


   Documented by: 


Carvedilol (Carvedilol 12.5 Mg Tab)  12.5 mg PO BID [facility]


   Last Admin: 04/25/21 09:40 Dose:  12.5 mg


   Documented by: 


Carvedilol (Carvedilol 3.125 Mg Tab)  6.25 mg PO ONETIME ONE


   Stop: 04/24/21 11:01


   Last Admin: 04/24/21 11:32 Dose:  6.25 mg


   Documented by: 


Clopidogrel Bisulfate (Clopidogrel 75 Mg Tab)  75 mg PO DAILY [facility]


   Last Admin: 04/25/21 09:41 Dose:  75 mg


   Documented by: 


Furosemide (Furosemide 20 Mg Tab)  20 mg PO DAILY [facility]


   Last Admin: 04/25/21 09:41 Dose:  20 mg


   Documented by: 


Furosemide (Furosemide 20 Mg/2 Ml Vial)  20 mg IVPUSH ONETIME ONE


   Stop: 04/24/21 05:44


   Last Admin: 04/24/21 05:45 Dose:  20 mg


   Documented by: 


Guaifenesin/Dextromethorphan (Guaifenesin/Dextromethorphan 100-10 Mg/5 Ml Soln 

10 Ml Cup)  10 ml PO Q4H PRN


   PRN Reason: Cough


   Last Admin: 04/25/21 02:45 Dose:  10 ml


   Documented by: 


Hydralazine HCl (Hydralazine 20 Mg/Ml Sdv)  10 mg IVPUSH Q2H PRN


   PRN Reason: Hypertension


Lactated Ringer's (Ringers, Lactated)  1,000 mls @ 1,000 mls/hr IV BOLUS ONE


   Stop: 04/21/21 15:07


   Last Admin: 04/21/21 14:15 Dose:  1,000 mls/hr


   Documented by: 


Levofloxacin/Dextrose 750 mg/ (Premix)  150 mls @ 100 mls/hr IV ONETIME ONE


   Stop: 04/21/21 16:56


   Last Admin: 04/21/21 15:40 Dose:  100 mls/hr


   Documented by: 


Lactated Ringer's (Ringers, Lactated)  1,000 mls @ 150 mls/hr IV ASDIRECTED [facility]


   Last Admin: 04/21/21 15:38 Dose:  150 mls/hr


   Documented by: 


Sodium Chloride (Normal Saline)  1,000 mls @ 100 mls/hr IV ASDIRECTED [facility]


   Last Admin: 04/22/21 09:18 Dose:  100 mls/hr


   Documented by: 


Levofloxacin/Dextrose 750 mg/ (Premix)  150 mls @ 100 mls/hr IV Q48H [facility]


   Last Admin: 04/23/21 16:58 Dose:  100 mls/hr


   Documented by: 


Sodium Chloride (Normal Saline)  500 mls @ 250 mls/hr IV .BOLUS ONE


   Stop: 04/22/21 05:53


   Last Admin: 04/22/21 04:01 Dose:  250 mls/hr


   Documented by: 


Sodium Chloride (Normal Saline)  1,000 mls @ 75 mls/hr IV ASDIRECTED [facility]


   Last Admin: 04/22/21 16:22 Dose:  75 mls/hr


   Documented by: 


Sodium Chloride (Normal Saline)  1,000 mls @ 999 mls/hr IV ONETIME ONE


   Stop: 04/22/21 16:00


   Last Admin: 04/22/21 15:10 Dose:  999 mls/hr


   Documented by: 


Sodium Chloride (Normal Saline)  500 mls @ 250 mls/hr IV .BOLUS ONE


   Stop: 04/22/21 20:30


   Last Admin: 04/22/21 18:35 Dose:  250 mls/hr


   Documented by: 


Sodium Chloride (Normal Saline)  1,000 mls @ 100 mls/hr IV ASDIRECTED [facility]


   Last Admin: 04/23/21 22:38 Dose:  125 mls/hr


   Documented by: 


Sodium Chloride (Normal Saline)  1,000 mls @ 50 mls/hr IV ASDIRECTED [facility]


Isosorbide Mononitrate (Isosorbide Mononitrate 30 Mg Tab.Er)  30 mg PO DAILY [facility]


   Last Admin: 04/25/21 09:40 Dose:  30 mg


   Documented by: 


Lactobacillus Rhamnosus (Lactobacillus Rhamnosus Gg (Probiotic) Cap)  1 cap PO 

BID [facility]


   Last Admin: 04/25/21 09:41 Dose:  1 cap


   Documented by: 


Latanoprost (Latanoprost 0.005% Ophth Soln 2.5 Ml Bottle)  0 ml EYEBOTH BEDTIME 

[facility]


   Last Admin: 04/24/21 21:27 Dose:  1 drop


   Documented by: 


Lorazepam (Lorazepam 2 Mg/Ml Sdv)  0.5 mg IVPUSH Q4H PRN


   PRN Reason: Nausea/Vomiting


Magnesium Hydroxide (Magnesium Hydroxide 400 Mg/5 Ml Susp 30 Ml Cup)  30 ml PO 

Q12H PRN


   PRN Reason: Constipation


Melatonin (Melatonin 3 Mg Tab)  9 mg PO BEDTIME [facility]


   Last Admin: 04/24/21 21:26 Dose:  9 mg


   Documented by: 


Mirtazapine (Mirtazapine 15 Mg Tab)  15 mg PO BEDTIME [facility]


   Last Admin: 04/24/21 21:27 Dose:  15 mg


   Documented by: 


Ondansetron HCl (Ondansetron 4 Mg/2 Ml Sdv)  4 mg IV Q6H PRN


   PRN Reason: Nausea/Vomiting


Ondansetron HCl (Ondansetron 4 Mg Tab.Dis)  4 mg PO Q6H PRN


   PRN Reason: Nausea able to take PO


Pantoprazole Sodium (Pantoprazole 40 Mg Tab.Cr)  40 mg PO ACBREAKFAST [facility]


   Last Admin: 04/25/21 09:41 Dose:  40 mg


   Documented by: 


Potassium Chloride (Potassium Chloride 20 Meq Tab.Er)  40 meq PO ONETIME ONE


   Stop: 04/21/21 17:31


   Last Admin: 04/21/21 17:22 Dose:  40 meq


   Documented by: 


Senna/Docusate Sodium (Docusate Sodium/Sennosides 50-8.6 Mg Tab)  2 tab PO DAILY

[facility]


   Last Admin: 04/25/21 09:40 Dose:  2 tab


   Documented by: 


Senna/Docusate Sodium (Docusate Sodium/Sennosides 50-8.6 Mg Tab)  1 tab PO BID 

PRN


   PRN Reason: Constipation


Sodium Chloride (Sodium Chloride 0.9% 10 Ml Syringe)  10 ml FLUSH ASDIRECTED PRN


   PRN Reason: Keep Vein Open


   Last Admin: 04/21/21 14:33 Dose:  10 ml


   Documented by: 


Tamsulosin HCl (Tamsulosin 0.4 Mg Cap.Er)  0.4 mg PO DAILY [facility]


   Last Admin: 04/25/21 09:41 Dose:  0.4 mg


   Documented by: 


Timolol Maleate (Timolol Maleate 0.5% Ophth Soln 5 Ml Bottle)  0 ml EYEBOTH 

BEDTIME [facility]


   Last Admin: 04/21/21 21:35 Dose:  Not Given


   Documented by: 


Timolol Maleate (Timolol Maleate 0.5% Ophth Soln 5 Ml Bottle)  0 ml EYEBOTH 

DAILY [facility]


   Last Admin: 04/25/21 09:41 Dose:  1 drop


   Documented by: 











*Q Meaningful Use (DIS)





- VTE *Q


VTE Pharmacological Contraindications *Q: Risk of Bleeding (hematuria yesterday)

## 2021-05-03 ENCOUNTER — HOSPITAL ENCOUNTER (EMERGENCY)
Dept: HOSPITAL 11 - JP.ED | Age: 86
Discharge: HOME | End: 2021-05-03
Payer: MEDICARE

## 2021-05-03 DIAGNOSIS — F17.200: ICD-10-CM

## 2021-05-03 DIAGNOSIS — I25.10: ICD-10-CM

## 2021-05-03 DIAGNOSIS — D64.9: ICD-10-CM

## 2021-05-03 DIAGNOSIS — I50.23: ICD-10-CM

## 2021-05-03 DIAGNOSIS — J44.9: Primary | ICD-10-CM

## 2021-05-03 DIAGNOSIS — I25.2: ICD-10-CM

## 2021-05-03 DIAGNOSIS — K21.9: ICD-10-CM

## 2021-05-03 DIAGNOSIS — Z79.82: ICD-10-CM

## 2021-05-03 DIAGNOSIS — Z79.899: ICD-10-CM

## 2021-05-03 DIAGNOSIS — R91.8: ICD-10-CM

## 2021-05-03 DIAGNOSIS — I11.0: ICD-10-CM

## 2021-05-03 PROCEDURE — 96374 THER/PROPH/DIAG INJ IV PUSH: CPT

## 2021-05-03 PROCEDURE — 80048 BASIC METABOLIC PNL TOTAL CA: CPT

## 2021-05-03 PROCEDURE — 99285 EMERGENCY DEPT VISIT HI MDM: CPT

## 2021-05-03 PROCEDURE — 86140 C-REACTIVE PROTEIN: CPT

## 2021-05-03 PROCEDURE — 36415 COLL VENOUS BLD VENIPUNCTURE: CPT

## 2021-05-03 PROCEDURE — 85027 COMPLETE CBC AUTOMATED: CPT

## 2021-05-03 PROCEDURE — 94640 AIRWAY INHALATION TREATMENT: CPT

## 2021-05-03 PROCEDURE — 71046 X-RAY EXAM CHEST 2 VIEWS: CPT

## 2021-05-03 PROCEDURE — 83880 ASSAY OF NATRIURETIC PEPTIDE: CPT

## 2021-05-03 PROCEDURE — 84484 ASSAY OF TROPONIN QUANT: CPT

## 2021-05-03 RX ADMIN — NITROGLYCERIN ONE GM: 20 OINTMENT TOPICAL at 11:48

## 2021-05-03 RX ADMIN — Medication PRN ML: at 11:54

## 2021-05-03 NOTE — CR
CHEST: 2 view

 

CLINICAL HISTORY:Chronic cough

 

COMPARISON:4/21/2021

 

FINDINGS:  The heart is enlarged. Pulmonary vascularity is normal. There is

patchy infiltrate in both lower lobes. This has increased since prior study.

There are small bibasal effusions. There are atherosclerotic changes in the

aorta. Lungs are hyperaerated.

 

IMPRESSION: Bilateral lower lobe infiltrates superimposed over chronic lung

changes

 

Small bilateral pleural effusions

 

Mild cardiomegaly

## 2021-05-03 NOTE — EDM.PDOC
ED HPI GENERAL MEDICAL PROBLEM





- General


Chief Complaint: Respiratory Problem


Stated Complaint: PNEUMONIA NOT GETTING BETTER


Time Seen by Provider: 05/03/21 10:15


Source of Information: Reports: Patient, Family, Old Records, RN


History Limitations: Reports: No Limitations





- History of Present Illness


INITIAL COMMENTS - FREE TEXT/NARRATIVE: 





85 yo male 1/2 ppd smoker was discharged last week from our hospital for 

pneumonia. He has not followed up in the clinic since discharge. He did finish 

his course of Levaquin that he was discharged on. He is not interested in 

quitting smoking. He presents to the ER for mild SOB not associated with fever. 

He has a chronic cough that is occasionally productive. His SOB does get worse 

with lying down, and he does have a pHx of CHF. He or a family member called the

clinic today regarding his sx's and was told to go to the ER. No one from the 

clinic notified us of this referral. Sx's have been present for about 4 days, he

was getting less SOB for awhile after discharge, now worse again. Thinks he has 

pneumonia. No CP. 


Onset: Gradual


Onset Date: 04/30/21


Duration: Day(s): (4), Getting Worse


Location: Reports: Chest


Quality: Reports: Other (no pain)


Severity: Mild


Improves with: Reports: Rest


Worsens with: Reports: Other (lying), Movement


Context: Reports: Other (See HPI)


Associated Symptoms: Reports: Cough (chronic), Shortness of Breath (mild).  

Denies: Fever/Chills


Treatments PTA: Reports: Other (see below) (see HPI)





- Related Data


                                    Allergies











Allergy/AdvReac Type Severity Reaction Status Date / Time


 


No Known Allergies Allergy   Verified 05/03/21 10:05











Home Meds: 


                                    Home Meds





Aspirin 81 mg PO DAILY 01/15/21 [History]


Bimatoprost [Lumigan 0.01% Ophth Soln] 1 drop EYEBOTH DAILY 01/15/21 [History]


Capsaicin [Zostrix 0.025% Crm] 1 dose TOP ASDIRECTED 01/15/21 [History]


Furosemide [Lasix] 20 mg PO DAILY 01/15/21 [History]


Hydrocodone/Acetaminophen [Hydrocodon-Acetaminophen 5-325] 1 tab PO ASDIRECTED 

PRN 01/15/21 [History]


Nitroglycerin 0.4 mg SL ASDIRECTED 01/15/21 [History]


Omeprazole 40 mg PO DAILY 01/15/21 [History]


Ondansetron [Zofran ODT] 4 mg PO ASDIRECTED PRN 01/15/21 [History]


Sennosides/Docusate Sodium [Senna-Docusate Sodium Tablet] 2 tab PO DAILY 

01/15/21 [History]


Timolol Maleate/PF [Timolol Maleate 0.5% Eye Drop] 1 dose EYEBOTH BEDTIME 

01/15/21 [History]


Clopidogrel [Plavix] 75 mg PO DAILY #30 tablet 01/18/21 [Rx]


Isosorbide Mononitrate [Isosorbide Mononitrate ER] 30 mg PO DAILY #30 01/18/21 

[Rx]


Mirtazapine 15 mg PO BEDTIME #30 01/18/21 [Rx]


Tamsulosin [Flomax] 0.4 mg PO DAILY #30 01/18/21 [Rx]


atorvaSTATin Calcium [Atorvastatin Calcium] 80 mg PO BEDTIME #30 01/18/21 [Rx]


carvediloL [Coreg] 12.5 mg PO BIDAC #60 tablet 01/18/21 [Rx]


lisinopriL [Lisinopril] 5 mg PO DAILY #30 tablet 05/03/21 [Rx]











Past Medical History


HEENT History: Reports: Impaired Vision


Cardiovascular History: Reports: CAD, Heart Failure, Hypertension, MI


Gastrointestinal History: Reports: GERD


Genitourinary History: Reports: BPH, Other (See Below)


Other Genitourinary History: Chronic mercado cath since November 2020.


Musculoskeletal History: Reports: Fracture, Other (See Below)


Other Musculoskeletal History: shoulder dislocation, fractured ribs.


Dermatologic History: Reports: Melanoma, Other (See Below)


Other Dermatologic History: melanoma on nose removed





- Infectious Disease History


Infectious Disease History: Reports: Influenza, Measles, Mumps, Rubella





- Past Surgical History


HEENT Surgical History: Reports: Tonsillectomy


GI Surgical History: Reports: Hernia Repair/Other





Social & Family History





- Tobacco Use


Tobacco Use Status *Q: Current Every Day Tobacco User


Years of Tobacco use: 70


Packs/Tins Daily: 0.5





- Caffeine Use


Caffeine Use: Reports: Coffee


Other Caffeine Use: 3 cpd





- Recreational Drug Use


Recreational Drug Use: No





ED ROS GENERAL





- Review of Systems


Review Of Systems: See Below


Constitutional: Denies: Fever, Chills


HEENT: Reports: No Symptoms


Respiratory: Reports: Shortness of Breath (mild), Cough (chronic), Sputum 

(occasionally, not worse than baseline).  Denies: Wheezing, Pleuritic Chest 

Pain, Hemoptysis


Cardiovascular: Reports: No Symptoms.  Denies: Chest Pain


Endocrine: Reports: No Symptoms


GI/Abdominal: Reports: No Symptoms


: Reports: No Symptoms


Musculoskeletal: Reports: No Symptoms


Skin: Reports: No Symptoms


Neurological: Reports: No Symptoms





ED EXAM, GENERAL





- Physical Exam


Exam: See Below


Exam Limited By: No Limitations


General Appearance: Alert, No Apparent Distress, Thin


Eye Exam: Bilateral Eye: Normal Inspection


Ears: Normal External Exam, Normal Canal, Hearing Loss


Ear Exam: Bilateral Ear: Auricle Normal, Canal Normal


Nose: Normal Inspection, No Blood


Throat/Mouth: Normal Inspection, Normal Lips, Normal Oropharynx, Normal Voice, 

No Airway Compromise


Head: Atraumatic, Normocephalic


Neck: Normal Inspection


Respiratory/Chest: Decreased Breath Sounds, Other (mild tachypnea).  No: Lungs 

Clear, Normal Breath Sounds, No Accessory Muscle Use, Respiratory Distress, 

Wheezing


Cardiovascular: Regular Rate, Rhythm, Other (trace edema of the RLE below the 

knees and above the sock line. ).  No: No Edema


GI/Abdominal: Normal Bowel Sounds, Soft, Non-Tender, No Distention


Back Exam: Normal Inspection.  No: CVA Tenderness (R), CVA Tenderness (L)


Extremities: Normal Inspection, Normal Range of Motion, Non-Tender, Pedal Edema 

(RLE below the knee, pits on pressure).  No: No Pedal Edema


Neurological: Alert, Oriented, CN II-XII Intact, Normal Cognition, No 

Motor/Sensory Deficits


Psychiatric: Normal Affect, Normal Mood


Skin Exam: Warm, Dry, Intact, Normal Color, No Rash





Course





- Vital Signs


Last Recorded V/S: 


                                Last Vital Signs











Temp  36.4 C   05/03/21 10:08


 


Pulse  88   05/03/21 12:12


 


Resp  26 H  05/03/21 12:08


 


BP  149/95 H  05/03/21 12:13


 


Pulse Ox  93 L  05/03/21 12:08














- Orders/Labs/Meds


Orders: 


                               Active Orders 24 hr











 Category Date Time Status


 


 RT Aerosol Therapy [RC] ASDIRECTED Care  05/03/21 10:24 Active


 


 Sodium Chloride 0.9% [Saline Flush] Med  05/03/21 11:16 Active





 10 ml FLUSH ASDIRECTED PRN   


 


 Saline Lock Insert [OM.PC] Routine Oth  05/03/21 11:16 Ordered








                                Medication Orders





Sodium Chloride (Sodium Chloride 0.9% 10 Ml Syringe)  10 ml FLUSH ASDIRECTED PRN


   PRN Reason: Keep Vein Open


   Last Admin: 05/03/21 11:54  Dose: 10 ml


   Documented by: HANNA








Labs: 


                                Laboratory Tests











  05/03/21 05/03/21 05/03/21 Range/Units





  10:27 10:35 10:35 


 


WBC   8.2   (4.5-11.0)  K/uL


 


RBC   3.33 L   (4.30-5.90)  M/uL


 


Hgb   10.4 L   (12.0-15.0)  g/dL


 


Hct   32.6 L   (40.0-54.0)  %


 


MCV   98   (80-98)  fL


 


MCH   31   (27-31)  pg


 


MCHC   32   (32-36)  %


 


Plt Count   257   (150-400)  K/uL


 


Sodium    147  (140-148)  mmol/L


 


Potassium    4.0  (3.6-5.2)  mmol/L


 


Chloride    110 H  (100-108)  mmol/L


 


Carbon Dioxide    27  (21-32)  mmol/L


 


Anion Gap    14.0  (5.0-14.0)  mmol/L


 


BUN    24 H  (7-18)  mg/dL


 


Creatinine    1.0  (0.8-1.3)  mg/dL


 


Est Cr Clr Drug Dosing    47.63  mL/min


 


Estimated GFR (MDRD)    > 60  (>60)  


 


Glucose    99  ()  mg/dL


 


Calcium    9.0  (8.5-10.1)  mg/dL


 


Troponin I     (0.000-0.056)  ng/mL


 


C-Reactive Protein    1.89 H  (0.0-0.3)  mg/dL


 


NT-Pro-B Natriuret Pep  60940 H    (5-450)  pg/mL














  05/03/21 Range/Units





  11:14 


 


WBC   (4.5-11.0)  K/uL


 


RBC   (4.30-5.90)  M/uL


 


Hgb   (12.0-15.0)  g/dL


 


Hct   (40.0-54.0)  %


 


MCV   (80-98)  fL


 


MCH   (27-31)  pg


 


MCHC   (32-36)  %


 


Plt Count   (150-400)  K/uL


 


Sodium   (140-148)  mmol/L


 


Potassium   (3.6-5.2)  mmol/L


 


Chloride   (100-108)  mmol/L


 


Carbon Dioxide   (21-32)  mmol/L


 


Anion Gap   (5.0-14.0)  mmol/L


 


BUN   (7-18)  mg/dL


 


Creatinine   (0.8-1.3)  mg/dL


 


Est Cr Clr Drug Dosing   mL/min


 


Estimated GFR (MDRD)   (>60)  


 


Glucose   ()  mg/dL


 


Calcium   (8.5-10.1)  mg/dL


 


Troponin I  0.056  (0.000-0.056)  ng/mL


 


C-Reactive Protein   (0.0-0.3)  mg/dL


 


NT-Pro-B Natriuret Pep   (5-450)  pg/mL











Meds: 


Medications











Generic Name Dose Route Start Last Admin





  Trade Name Freq  PRN Reason Stop Dose Admin


 


Sodium Chloride  10 ml  05/03/21 11:16  05/03/21 11:54





  Sodium Chloride 0.9% 10 Ml Syringe  FLUSH   10 ml





  ASDIRECTED PRN   Administration





  Keep Vein Open  














Discontinued Medications














Generic Name Dose Route Start Last Admin





  Trade Name Freq  PRN Reason Stop Dose Admin


 


Albuterol/Ipratropium  3 ml  05/03/21 10:24  05/03/21 10:40





  Albuterol/Ipratropium 3.0-0.5 Mg/3 Ml Neb Soln  NEB  05/03/21 10:25  3 ml





  ONETIME ONE   Administration


 


Aspirin  81 mg  05/03/21 11:58  05/03/21 12:12





  Aspirin 81 Mg Tab.Chew  PO  05/03/21 11:59  81 mg





  ONETIME ONE   Administration


 


Carvedilol  12.5 mg  05/03/21 11:56  05/03/21 12:12





  Carvedilol 12.5 Mg Tab  PO  05/03/21 11:57  12.5 mg





  ONETIME ONE   Administration


 


Clopidogrel Bisulfate  75 mg  05/03/21 11:57  05/03/21 12:13





  Clopidogrel 75 Mg Tab  PO  05/03/21 11:58  75 mg





  ONETIME ONE   Administration


 


Doxycycline Hyclate  100 mg  05/03/21 11:46  05/03/21 11:53





  Doxycycline 100 Mg Cap  PO  05/03/21 11:47  100 mg





  ONETIME ONE   Administration


 


Furosemide  40 mg  05/03/21 11:16  05/03/21 11:48





  Furosemide 40 Mg/4 Ml Vial  IVPUSH  05/03/21 11:17  40 mg





  ONETIME ONE   Administration


 


Isosorbide Mononitrate  30 mg  05/03/21 11:57  05/03/21 12:13





  Isosorbide Mononitrate 30 Mg Tab.Er  PO  05/03/21 11:58  30 mg





  ONETIME ONE   Administration


 


Lisinopril  5 mg  05/03/21 13:50 





  Lisinopril 5 Mg Tab  PO  05/03/21 13:51 





  ONETIME ONE  


 


Nitroglycerin  1 gm  05/03/21 11:16  05/03/21 11:48





  Nitroglycerin 2% Oint 1 Gm Ud Packet  TOP  05/03/21 11:17  1 gm





  ONETIME ONE   Administration














- Radiology Interpretation


Free Text/Narrative:: 





CXR-





- Re-Assessments/Exams


Free Text/Narrative Re-Assessment/Exam: 





05/03/21 11:14


Minimal benefit with Duoneb


Free Text/Narrative Re-Assessment/Exam: 





05/03/21 13:39


discharge planning here to help with more in home services, will try to get him 

a sooner clinic follow up appt. Probably needs home oxygen with activity at 

least. Not able to offer this through the ER. 





Departure





- Departure


Time of Disposition: 14:10


Disposition: Home, Self-Care 01


Condition: Fair


Clinical Impression: 


 Tobacco abuse, Pulmonary infiltrate, Chronic anemia





CHF (congestive heart failure)


Qualifiers:


 Heart failure type: systolic Heart failure chronicity: acute on chronic 

Qualified Code(s): I50.23 - Acute on chronic systolic (congestive) heart failure





COPD (chronic obstructive pulmonary disease)


Qualifiers:


 COPD type: unspecified COPD Qualified Code(s): J44.9 - Chronic obstructive 

pulmonary disease, unspecified








- Discharge Information


*PRESCRIPTION DRUG MONITORING PROGRAM REVIEWED*: Not Applicable


*COPY OF PRESCRIPTION DRUG MONITORING REPORT IN PATIENT HODA: Not Applicable


Prescriptions: 


lisinopriL [Lisinopril] 5 mg PO DAILY #30 tablet


Instructions:  Heart Failure, Self Care, Health Risks of Smoking


Referrals: 


New Eduardo MD [Physician] - 05/05/21 11:00 am (Your appointment is at the 

[facility].)


Forms:  ED Department Discharge


Additional Instructions: 


F/U in the [facility] per the appt we got for you. Start lisinopril 5 mg 

daily tomorrow. Alternate furosemide dose, tomorrow take 20 mg in the morning 

and then on Wednesday take 40 mg in the morning. Continue this pattern for 

dosing unless your doctor changes it. Avoid salt and salty foods. Return as 

needed. Home care will be coming out starting on Wednesday. 


Care Plan Goals: 


A referral was made to [facility] for home care services.  [facility] will 

contact you this week to schedule your home visit.





Sepsis Event Note (ED)





- Evaluation


Sepsis Screening Result: Possible Sepsis Risk





- Focused Exam


Vital Signs: 


                                   Vital Signs











  Temp Pulse Pulse Resp BP BP Pulse Ox


 


 05/03/21 12:13      149/95 H  


 


 05/03/21 12:12   88    149/95 H  


 


 05/03/21 12:08    89  26 H   149/95 H  93 L


 


 05/03/21 10:08  36.4 C   98  22 H   155/85 H  96


 


 05/03/21 10:00    103 H    155/85 H  97














- My Orders


Last 24 Hours: 


My Active Orders





05/03/21 10:24


RT Aerosol Therapy [RC] ASDIRECTED 





05/03/21 11:16


Sodium Chloride 0.9% [Saline Flush]   10 ml FLUSH ASDIRECTED PRN 


Saline Lock Insert [OM.PC] Routine 














- Assessment/Plan


Last 24 Hours: 


My Active Orders





05/03/21 10:24


RT Aerosol Therapy [RC] ASDIRECTED 





05/03/21 11:16


Sodium Chloride 0.9% [Saline Flush]   10 ml FLUSH ASDIRECTED PRN 


Saline Lock Insert [OM.PC] Routine

## 2021-06-21 ENCOUNTER — HOSPITAL ENCOUNTER (EMERGENCY)
Dept: HOSPITAL 11 - JP.ED | Age: 86
Discharge: HOME | End: 2021-06-21
Payer: MEDICARE

## 2021-06-21 DIAGNOSIS — Z79.899: ICD-10-CM

## 2021-06-21 DIAGNOSIS — I11.0: ICD-10-CM

## 2021-06-21 DIAGNOSIS — K21.9: ICD-10-CM

## 2021-06-21 DIAGNOSIS — Z79.02: ICD-10-CM

## 2021-06-21 DIAGNOSIS — N30.00: Primary | ICD-10-CM

## 2021-06-21 DIAGNOSIS — I25.10: ICD-10-CM

## 2021-06-21 DIAGNOSIS — N40.0: ICD-10-CM

## 2021-06-21 DIAGNOSIS — I25.2: ICD-10-CM

## 2021-06-21 DIAGNOSIS — Z79.82: ICD-10-CM

## 2021-06-21 DIAGNOSIS — Z72.0: ICD-10-CM

## 2021-06-21 DIAGNOSIS — I50.9: ICD-10-CM

## 2022-08-29 NOTE — EDM.PDOC
[address]  [place] [address] [address]  Phone: [phone]  Fax: [phone]    Jaclyn Arellano MD        August 29, 2022    Suzy Jechaz  [address]      To Whom it May Concern:    Beena Prescott was present at my office today for an appointment. If you have any questions or concerns, please don't hesitate to call.     Sincerely,        Jaclyn Arellano MD ED HPI GENERAL MEDICAL PROBLEM





- General


Chief Complaint: Chest Pain


Stated Complaint: MEDICAL


Time Seen by Provider: 01/15/21 14:25


Source of Information: Reports: Patient, EMS.  Denies: Old Records


History Limitations: Reports: Other (no old records)





- History of Present Illness


INITIAL COMMENTS - FREE TEXT/NARRATIVE: 





84 yo male with a recent hx of chest pain referred from [facility] to [facility] for 3 vessel CAD. It was decided because of his age to manage him 

medically. His family doctor is in [place]. He recently ran out of some of the 

meds that were started in [place] and he can't get an appt for several days yet 

and his primary won't refill his meds until he is seen. One of the meds he is 

out of is carvedilol. Since running out of this he has had to use some of his 

NTG. Sx's include RAMOS, orthopnea, and insomnia from the SOB. These sx's started 

last evening and are not any worse today, but he decided to use an ambulance to 

come to [facility] today where he has never been before. Has used some NTG 

since running out of his Carvedilol. 


Onset: Gradual


Onset Date: 01/14/21


Onset Time: 21:00


Duration: Hour(s):, Constant


Location: Reports: Chest


Quality: Reports: Other (no pain)


Severity: Mild (SOB)


Improves with: Reports: Other (rest and sitting up)


Worsens with: Reports: Movement (or lying flat)


Context: Reports: Other (see HPI)


Associated Symptoms: Reports: Shortness of Breath.  Denies: Diaphoresis, 

Nausea/Vomiting


Treatments PTA: Reports: Other (see below) (none)





- Related Data


                                    Allergies











Allergy/AdvReac Type Severity Reaction Status Date / Time


 


No Known Allergies Allergy   Verified 01/15/21 14:21











Home Meds: 


                                    Home Meds





Aspirin 81 mg PO DAILY 01/15/21 [History]


Bimatoprost [Lumigan 0.01% Ophth Soln] 1 drop EYEBOTH DAILY 01/15/21 [History]


Capsaicin [Zostrix 0.025% Crm] 1 dose TOP ASDIRECTED 01/15/21 [History]


Furosemide [Lasix] 20 mg PO DAILY 01/15/21 [History]


Hydrocodone/Acetaminophen [Hydrocodon-Acetaminophen 5-325] 1 tab PO ASDIRECTED 

01/15/21 [History]


Isosorbide Mononitrate [Isosorbide Mononitrate ER] 30 mg PO DAILY 01/15/21 

[History]


Mirtazapine 15 mg PO BEDTIME 01/15/21 [History]


Nitroglycerin 0.4 mg SL ASDIRECTED 01/15/21 [History]


Omeprazole 40 mg PO DAILY 01/15/21 [History]


Ondansetron [Zofran ODT] 4 mg PO ASDIRECTED 01/15/21 [History]


Sennosides/Docusate Sodium [Senna-Docusate Sodium Tablet] 2 tab PO DAILY 

01/15/21 [History]


Tamsulosin [Flomax] 0.4 mg PO DAILY 01/15/21 [History]


Timolol Maleate/PF [Timolol Maleate 0.5% Eye Drop] 1 dose EYEBOTH BEDTIME 

01/15/21 [History]


atorvaSTATin Calcium [Atorvastatin Calcium] 80 mg PO BEDTIME 01/15/21 [History]


carvediloL [Carvedilol] 6.25 mg PO BID 01/15/21 [History]











Social & Family History





- Tobacco Use


Tobacco Use Status *Q: Light Tobacco User


Years of Tobacco use: 72


Packs/Tins Daily: 0.5





- Caffeine Use


Caffeine Use: Reports: Coffee





- Recreational Drug Use


Recreational Drug Use: No





ED ROS GENERAL





- Review of Systems


Review Of Systems: See Below


Constitutional: Reports: No Symptoms.  Denies: Fever, Chills, Diaphoresis


HEENT: Reports: No Symptoms


Respiratory: Reports: Shortness of Breath.  Denies: Wheezing, Pleuritic Chest 

Pain, Cough, Sputum, Hemoptysis


Cardiovascular: Reports: No Symptoms


Endocrine: Reports: No Symptoms


GI/Abdominal: Reports: No Symptoms


: Reports: No Symptoms


Musculoskeletal: Reports: No Symptoms


Skin: Reports: No Symptoms


Neurological: Reports: No Symptoms





ED EXAM, GENERAL





- Physical Exam


Exam: See Below


Exam Limited By: No Limitations


General Appearance: Alert, WD/WN, No Apparent Distress


Eye Exam: Bilateral Eye: Normal Inspection


Ears: Normal External Exam, Normal Canal, Hearing Grossly Normal


Ear Exam: Bilateral Ear: Auricle Normal, Canal Normal


Nose: Normal Inspection, No Blood


Throat/Mouth: Normal Inspection, Normal Lips, Normal Oropharynx, Normal Voice, 

No Airway Compromise


Head: Atraumatic, Normocephalic


Neck: Normal Inspection


Respiratory/Chest: No Respiratory Distress, Lungs Clear, Normal Breath Sounds, 

No Accessory Muscle Use


Cardiovascular: Regular Rate, Rhythm, No Edema, Tachycardia


GI/Abdominal: Normal Bowel Sounds, Soft, Non-Tender, No Distention


Back Exam: Normal Inspection.  No: CVA Tenderness (R), CVA Tenderness (L)


Extremities: Normal Inspection, Normal Range of Motion, Non-Tender, No Pedal 

Edema


Neurological: Alert, Oriented, CN II-XII Intact, Normal Cognition, No 

Motor/Sensory Deficits


Psychiatric: Normal Affect, Normal Mood


Skin Exam: Warm, Dry, Intact, Normal Color, No Rash, Other (1.1 cm in diameter 

basal cell CA on R forehead. )


  ** #1 Interpretation


EKG Date: 01/15/21


Time: 14:35


Rhythm: NSR


Rate (Beats/Min): 116


Axis: Normal


P-Wave: Present


QRS: Normal


ST-T: Normal


QT: Normal


Comparison: NA - No Prior EKG (PAC's present. LVH present. Probable old inferior

 MI.)





Course





- Vital Signs


Text/Narrative:: 





Discussed case with Dr. Forrest and Gildardo @ 1600h


Last Recorded V/S: 


                                Last Vital Signs











Temp  36.6 C   01/15/21 14:24


 


Pulse  101 H  01/15/21 15:51


 


Resp  22 H  01/15/21 14:24


 


BP  151/99 H  01/15/21 15:51


 


Pulse Ox  96   01/15/21 14:24














- Orders/Labs/Meds


Orders: 


                               Active Orders 24 hr











 Category Date Time Status


 


 Cardiac Monitoring [RC] .As Directed Care  01/15/21 14:16 Active


 


 EKG Documentation Completion [RC] ASDIRECTED Care  01/15/21 14:16 Active


 


 Chest 1V Frontal [CR] Stat Exams  01/15/21 15:41 Ordered


 


 Furosemide [Lasix] Med  01/15/21 16:06 Once





 40 mg IVPUSH ONETIME ONE   


 


 EKG 12 Lead [EK] Routine Ther  01/15/21 14:15 Ordered








                                Medication Orders





Furosemide (Lasix)  40 mg IVPUSH ONETIME ONE


   Stop: 01/15/21 16:07








Labs: 


                                Laboratory Tests











  01/15/21 01/15/21 01/15/21 Range/Units





  14:56 14:56 15:40 


 


WBC    7.2  (4.5-11.0)  K/uL


 


RBC    3.77 L  (4.30-5.90)  M/uL


 


Hgb    12.1  (12.0-15.0)  g/dL


 


Hct    38.0 L  (40.0-54.0)  %


 


MCV    101 H  (80-98)  fL


 


MCH    32 H  (27-31)  pg


 


MCHC    32  (32-36)  %


 


Plt Count    183  (150-400)  K/uL


 


Sodium   144   (140-148)  mmol/L


 


Potassium   4.1   (3.6-5.2)  mmol/L


 


Chloride   108   (100-108)  mmol/L


 


Carbon Dioxide   23   (21-32)  mmol/L


 


Anion Gap   13.2   (5.0-14.0)  mmol/L


 


BUN   32 H   (7-18)  mg/dL


 


Creatinine   1.1   (0.8-1.3)  mg/dL


 


Est Cr Clr Drug Dosing   43.47   mL/min


 


Estimated GFR (MDRD)   > 60   (>60)  


 


Glucose   104   ()  mg/dL


 


Calcium   9.2   (8.5-10.1)  mg/dL


 


Troponin I  1.497 H*    (0.000-0.056)  ng/mL


 


NT-Pro-B Natriuret Pep  53621 H    (5-450)  pg/mL











Meds: 


Medications











Generic Name Dose Route Start Last Admin





  Trade Name Freq  PRN Reason Stop Dose Admin


 


Furosemide  40 mg  01/15/21 16:06 





  Lasix  IVPUSH  01/15/21 16:07 





  ONETIME ONE  














Discontinued Medications














Generic Name Dose Route Start Last Admin





  Trade Name Freq  PRN Reason Stop Dose Admin


 


Aspirin  243 mg  01/15/21 15:40  01/15/21 15:51





  Aspirin  PO  01/15/21 15:41  243 mg





  ONETIME ONE   Administration


 


Carvedilol  6.25 mg  01/15/21 14:37  01/15/21 14:53





  Coreg  PO  01/15/21 14:38  6.25 mg





  ONETIME ONE   Administration


 


Carvedilol  6.25 mg  01/15/21 15:44  01/15/21 15:51





  Coreg  PO  01/15/21 15:45  6.25 mg





  ONETIME ONE   Administration


 


Heparin Sodium (Porcine)  4,000 units  01/15/21 15:42  01/15/21 15:54





  Heparin Sodium  IVPUSH  01/15/21 15:43  4,000 units





  ONETIME ONE   Administration


 


Ticagrelor  180 mg  01/15/21 15:43  01/15/21 15:51





  Brilinta  PO  01/15/21 15:44  180 mg





  ONETIME ONE   Administration














- Radiology Interpretation


Free Text/Narrative:: 





CXR-





Departure





- Departure


Time of Disposition: 16:10


Disposition: Admitted As Inpatient 66


Condition: Serious


Clinical Impression: 


 Difficulty obtaining medication, Non-STEMI (non-ST elevated myocardial 

infarction)





Referrals: 


PCP,None [Primary Care Provider] - 


Forms:  ED Department Discharge





Sepsis Event Note (ED)





- Evaluation


Sepsis Screening Result: No Definite Risk





- Focused Exam


Vital Signs: 


                                   Vital Signs











  Temp Pulse Pulse Resp BP BP Pulse Ox


 


 01/15/21 15:51   101 H    151/99 H  


 


 01/15/21 14:53   104 H    179/101 H  


 


 01/15/21 14:24  36.6 C   115 H  22 H   178/103 H  96


 


 01/15/21 14:20  36.6 C   115 H  22 H   178/103 H  96














- My Orders


Last 24 Hours: 


My Active Orders





01/15/21 14:15


EKG 12 Lead [EK] Routine 





01/15/21 14:16


Cardiac Monitoring [RC] .As Directed 


EKG Documentation Completion [RC] ASDIRECTED 





01/15/21 15:41


Chest 1V Frontal [CR] Stat 





01/15/21 16:06


Furosemide [Lasix]   40 mg IVPUSH ONETIME ONE 














- Assessment/Plan


Last 24 Hours: 


My Active Orders





01/15/21 14:15


EKG 12 Lead [EK] Routine 





01/15/21 14:16


Cardiac Monitoring [RC] .As Directed 


EKG Documentation Completion [RC] ASDIRECTED 





01/15/21 15:41


Chest 1V Frontal [CR] Stat 





01/15/21 16:06


Furosemide [Lasix]   40 mg IVPUSH ONETIME ONE